# Patient Record
Sex: FEMALE | Race: OTHER | HISPANIC OR LATINO
[De-identification: names, ages, dates, MRNs, and addresses within clinical notes are randomized per-mention and may not be internally consistent; named-entity substitution may affect disease eponyms.]

---

## 2017-12-19 ENCOUNTER — APPOINTMENT (OUTPATIENT)
Dept: CARDIOLOGY | Facility: CLINIC | Age: 47
End: 2017-12-19
Payer: MEDICAID

## 2017-12-19 PROBLEM — Z00.00 ENCOUNTER FOR PREVENTIVE HEALTH EXAMINATION: Status: ACTIVE | Noted: 2017-12-19

## 2017-12-19 PROCEDURE — 93000 ELECTROCARDIOGRAM COMPLETE: CPT

## 2017-12-19 PROCEDURE — 99214 OFFICE O/P EST MOD 30 MIN: CPT

## 2018-02-02 ENCOUNTER — MEDICATION RENEWAL (OUTPATIENT)
Age: 48
End: 2018-02-02

## 2018-02-14 ENCOUNTER — RECORD ABSTRACTING (OUTPATIENT)
Age: 48
End: 2018-02-14

## 2018-02-14 DIAGNOSIS — Z86.79 PERSONAL HISTORY OF OTHER DISEASES OF THE CIRCULATORY SYSTEM: ICD-10-CM

## 2018-02-14 DIAGNOSIS — Z78.9 OTHER SPECIFIED HEALTH STATUS: ICD-10-CM

## 2018-03-02 ENCOUNTER — RX RENEWAL (OUTPATIENT)
Age: 48
End: 2018-03-02

## 2018-03-06 ENCOUNTER — APPOINTMENT (OUTPATIENT)
Dept: CARDIOLOGY | Facility: CLINIC | Age: 48
End: 2018-03-06
Payer: MEDICAID

## 2018-03-06 PROCEDURE — 93306 TTE W/DOPPLER COMPLETE: CPT

## 2018-03-20 ENCOUNTER — MEDICATION RENEWAL (OUTPATIENT)
Age: 48
End: 2018-03-20

## 2018-03-20 ENCOUNTER — RESULT REVIEW (OUTPATIENT)
Age: 48
End: 2018-03-20

## 2018-03-27 ENCOUNTER — APPOINTMENT (OUTPATIENT)
Dept: CARDIOLOGY | Facility: CLINIC | Age: 48
End: 2018-03-27
Payer: MEDICAID

## 2018-03-27 VITALS
WEIGHT: 134.31 LBS | SYSTOLIC BLOOD PRESSURE: 106 MMHG | BODY MASS INDEX: 26.37 KG/M2 | RESPIRATION RATE: 16 BRPM | DIASTOLIC BLOOD PRESSURE: 70 MMHG | HEIGHT: 60 IN | HEART RATE: 70 BPM

## 2018-03-27 LAB — INR PPP: 2.4 RATIO

## 2018-03-27 PROCEDURE — 85610 PROTHROMBIN TIME: CPT | Mod: QW

## 2018-03-27 PROCEDURE — 99215 OFFICE O/P EST HI 40 MIN: CPT

## 2018-03-27 PROCEDURE — 93000 ELECTROCARDIOGRAM COMPLETE: CPT

## 2018-03-27 RX ORDER — LISINOPRIL 5 MG/1
5 TABLET ORAL DAILY
Qty: 90 | Refills: 3 | Status: DISCONTINUED | COMMUNITY
End: 2018-03-27

## 2018-04-26 ENCOUNTER — MEDICATION RENEWAL (OUTPATIENT)
Age: 48
End: 2018-04-26

## 2018-04-26 ENCOUNTER — APPOINTMENT (OUTPATIENT)
Dept: CARDIOLOGY | Facility: CLINIC | Age: 48
End: 2018-04-26
Payer: MEDICAID

## 2018-04-26 VITALS — HEART RATE: 72 BPM | SYSTOLIC BLOOD PRESSURE: 100 MMHG | RESPIRATION RATE: 12 BRPM | DIASTOLIC BLOOD PRESSURE: 70 MMHG

## 2018-04-26 PROCEDURE — 85610 PROTHROMBIN TIME: CPT | Mod: QW

## 2018-04-26 PROCEDURE — 99211 OFF/OP EST MAY X REQ PHY/QHP: CPT

## 2018-04-27 LAB — INR PPP: 2.8 RATIO

## 2018-05-08 ENCOUNTER — APPOINTMENT (OUTPATIENT)
Dept: CARDIOLOGY | Facility: CLINIC | Age: 48
End: 2018-05-08
Payer: MEDICAID

## 2018-05-08 VITALS
HEART RATE: 70 BPM | HEIGHT: 62 IN | DIASTOLIC BLOOD PRESSURE: 70 MMHG | SYSTOLIC BLOOD PRESSURE: 110 MMHG | RESPIRATION RATE: 18 BRPM | BODY MASS INDEX: 23.92 KG/M2 | WEIGHT: 130 LBS

## 2018-05-08 PROCEDURE — 99214 OFFICE O/P EST MOD 30 MIN: CPT

## 2018-05-08 PROCEDURE — 93000 ELECTROCARDIOGRAM COMPLETE: CPT

## 2018-05-08 RX ORDER — CHLORHEXIDINE GLUCONATE 4 %
325 (65 FE) LIQUID (ML) TOPICAL TWICE DAILY
Refills: 0 | Status: DISCONTINUED | COMMUNITY
End: 2018-05-08

## 2018-05-25 ENCOUNTER — RX RENEWAL (OUTPATIENT)
Age: 48
End: 2018-05-25

## 2018-05-25 RX ORDER — WARFARIN 3 MG/1
3 TABLET ORAL DAILY
Qty: 90 | Refills: 1 | Status: DISCONTINUED | COMMUNITY
End: 2018-05-25

## 2018-06-05 ENCOUNTER — RX RENEWAL (OUTPATIENT)
Age: 48
End: 2018-06-05

## 2018-06-22 ENCOUNTER — APPOINTMENT (OUTPATIENT)
Dept: CARDIOLOGY | Facility: CLINIC | Age: 48
End: 2018-06-22
Payer: MEDICAID

## 2018-06-22 VITALS
HEIGHT: 62 IN | DIASTOLIC BLOOD PRESSURE: 68 MMHG | RESPIRATION RATE: 16 BRPM | WEIGHT: 133 LBS | BODY MASS INDEX: 24.48 KG/M2 | HEART RATE: 70 BPM | SYSTOLIC BLOOD PRESSURE: 108 MMHG

## 2018-06-22 PROCEDURE — 93000 ELECTROCARDIOGRAM COMPLETE: CPT

## 2018-06-22 PROCEDURE — 99214 OFFICE O/P EST MOD 30 MIN: CPT

## 2018-06-22 RX ORDER — WARFARIN 1 MG/1
1 TABLET ORAL
Qty: 30 | Refills: 0 | Status: COMPLETED | COMMUNITY
Start: 2017-04-20

## 2018-06-22 RX ORDER — SIMVASTATIN 20 MG/1
20 TABLET, FILM COATED ORAL
Qty: 30 | Refills: 0 | Status: COMPLETED | COMMUNITY
Start: 2017-10-02

## 2018-07-13 ENCOUNTER — RX RENEWAL (OUTPATIENT)
Age: 48
End: 2018-07-13

## 2018-07-26 LAB — INR PPP: 1.9 RATIO

## 2018-09-21 ENCOUNTER — MEDICATION RENEWAL (OUTPATIENT)
Age: 48
End: 2018-09-21

## 2018-09-27 ENCOUNTER — APPOINTMENT (OUTPATIENT)
Dept: CARDIOLOGY | Facility: CLINIC | Age: 48
End: 2018-09-27
Payer: MEDICAID

## 2018-09-27 VITALS
HEIGHT: 62 IN | SYSTOLIC BLOOD PRESSURE: 110 MMHG | HEART RATE: 70 BPM | DIASTOLIC BLOOD PRESSURE: 78 MMHG | BODY MASS INDEX: 24.66 KG/M2 | RESPIRATION RATE: 14 BRPM | WEIGHT: 134 LBS

## 2018-09-27 PROCEDURE — 85610 PROTHROMBIN TIME: CPT | Mod: QW

## 2018-09-27 PROCEDURE — 93000 ELECTROCARDIOGRAM COMPLETE: CPT

## 2018-09-27 PROCEDURE — 99214 OFFICE O/P EST MOD 30 MIN: CPT

## 2018-09-27 RX ORDER — SIMVASTATIN 40 MG/1
40 TABLET, FILM COATED ORAL DAILY
Qty: 45 | Refills: 3 | Status: DISCONTINUED | COMMUNITY
Start: 2018-02-02 | End: 2018-09-27

## 2018-11-19 ENCOUNTER — RX RENEWAL (OUTPATIENT)
Age: 48
End: 2018-11-19

## 2018-12-14 ENCOUNTER — APPOINTMENT (OUTPATIENT)
Dept: CARDIOLOGY | Facility: CLINIC | Age: 48
End: 2018-12-14
Payer: MEDICAID

## 2018-12-14 VITALS
DIASTOLIC BLOOD PRESSURE: 70 MMHG | SYSTOLIC BLOOD PRESSURE: 110 MMHG | WEIGHT: 134 LBS | RESPIRATION RATE: 14 BRPM | HEIGHT: 62 IN | HEART RATE: 70 BPM | BODY MASS INDEX: 24.66 KG/M2

## 2018-12-14 LAB — INR PPP: 2.2 RATIO

## 2018-12-14 PROCEDURE — 99214 OFFICE O/P EST MOD 30 MIN: CPT

## 2018-12-14 PROCEDURE — 93000 ELECTROCARDIOGRAM COMPLETE: CPT

## 2018-12-17 NOTE — HISTORY OF PRESENT ILLNESS
[FreeTextEntry1] : Mrs. Dubon presents today without complaints of exertional chest pain, shortness of breath, palpitations, lightheadedness, or syncope.

## 2018-12-17 NOTE — ASSESSMENT
[FreeTextEntry1] : 1.  EKG today reveals ventricular paced rhythm at 70 bpm with the underlying rhythm being that of atrial fibrillation. \par 2.  Chronic atrial fibrillation:  Patient clinically stable with an INR today of 2.2.  She has been advised to change her Coumadin to 3.5 mg a day other than Mondays and Thursdays when she will take three milligrams.  Patient advised to have her Coumadin checked on Mondays.  Will repeat INR in two weeks and if therapeutic, every four weeks thereafter.  \par 3.  Rheumatic valvular heart disease:  Patient’s metallic MVR demonstrates normal closure on auscultation.  \par 4.  Dilated cardiomyopathy status-post AICD implantation:  Clinically stable and due to undergo AICD interrogation in the next few weeks.  If stable, will see me in three months.   \par

## 2018-12-17 NOTE — PHYSICAL EXAM
[General Appearance - Well Developed] : well developed [General Appearance - Well Nourished] : well nourished [General Appearance - In No Acute Distress] : no acute distress [Normal Conjunctiva] : the conjunctiva exhibited no abnormalities [Normal Oral Mucosa] : normal oral mucosa [Regular] : the rhythm was regular [Systolic grade ___/6] : A grade [unfilled]/6 systolic murmur was heard. [Auscultation Breath Sounds / Voice Sounds] : lungs were clear to auscultation bilaterally [Bowel Sounds] : normal bowel sounds [Abnormal Walk] : normal gait [Skin Color & Pigmentation] : normal skin color and pigmentation [Skin Turgor] : normal skin turgor [Oriented To Time, Place, And Person] : oriented to person, place, and time [Affect] : the affect was normal [Mood] : the mood was normal [FreeTextEntry1] : No edema.

## 2018-12-17 NOTE — REASON FOR VISIT
[FreeTextEntry1] : Mrs. Dubon presents today for the evaluation and management of rheumatic valvular heart disease for which she is status-post metallic MVR, associated dilated cardiomyopathy with reduced left ventricular ejection fraction status-post AICD implantation, and chronic atrial fibrillation.    \par   \par

## 2019-01-02 ENCOUNTER — MEDICATION RENEWAL (OUTPATIENT)
Age: 49
End: 2019-01-02

## 2019-03-26 ENCOUNTER — APPOINTMENT (OUTPATIENT)
Dept: CARDIOLOGY | Facility: CLINIC | Age: 49
End: 2019-03-26
Payer: MEDICAID

## 2019-03-26 ENCOUNTER — NON-APPOINTMENT (OUTPATIENT)
Age: 49
End: 2019-03-26

## 2019-03-26 VITALS
BODY MASS INDEX: 25.4 KG/M2 | SYSTOLIC BLOOD PRESSURE: 108 MMHG | DIASTOLIC BLOOD PRESSURE: 60 MMHG | HEIGHT: 62 IN | HEART RATE: 70 BPM | RESPIRATION RATE: 16 BRPM | WEIGHT: 138 LBS

## 2019-03-26 PROCEDURE — 99214 OFFICE O/P EST MOD 30 MIN: CPT

## 2019-03-26 PROCEDURE — 93000 ELECTROCARDIOGRAM COMPLETE: CPT

## 2019-03-27 NOTE — PHYSICAL EXAM
[General Appearance - Well Developed] : well developed [General Appearance - Well Nourished] : well nourished [General Appearance - In No Acute Distress] : no acute distress [Normal Conjunctiva] : the conjunctiva exhibited no abnormalities [Normal Oral Mucosa] : normal oral mucosa [Auscultation Breath Sounds / Voice Sounds] : lungs were clear to auscultation bilaterally [Regular] : the rhythm was regular [Systolic grade ___/6] : A grade [unfilled]/6 systolic murmur was heard. [Bowel Sounds] : normal bowel sounds [Abnormal Walk] : normal gait [Skin Color & Pigmentation] : normal skin color and pigmentation [Skin Turgor] : normal skin turgor [Oriented To Time, Place, And Person] : oriented to person, place, and time [Affect] : the affect was normal [Mood] : the mood was normal [FreeTextEntry1] : No edema.

## 2019-03-27 NOTE — HISTORY OF PRESENT ILLNESS
[FreeTextEntry1] : Mrs. Dubon presents today for a follow up evaluation.  At this time, she denies exertional chest pain, shortness of breath, palpitations, lightheadedness, or syncope.  She states that she was seen in the emergency room at [facility] on March 17th when she presented there with “dizziness.”  The patient was evaluated and subsequently discharged to undergo outpatient follow up.  Since that time, she has had no further symptoms.

## 2019-03-27 NOTE — ASSESSMENT
[FreeTextEntry1] : 1.  EKG today reveals ventricular paced rhythm at 70 bpm. \par 2.  Dizziness:  Patient without evidence of arrhythmia on AICD interrogation while in hospital.  Has had no further symptoms since discharge.  Does admit to fever and some sort of an upper respiratory tract infection prior to her admission.  At this time, I have advised the patient on adequate hydration and continuation of current medications. \par 3.  Dilated cardiomyopathy/AICD:  Patient with known depleting battery.  Current lifespan approximately 6 months.  Will follow up at [place].  Wishes to see Dr. Fabián Arredondo. \par 4.  Chronic atrial fibrillation.  Remains clinically stable.  INR today 2.2.  I have advised her to take 6 mg of Coumadin today and then to augment her daily dose to 3.5 daily.  Repeat INR in 7 days. \par 5.  If clinically stable, follow up office visit here three months. \par

## 2019-03-27 NOTE — REASON FOR VISIT
[FreeTextEntry1] : Mrs. Dubon is a pleasant 48-year-old  female with a past medical history significant for rheumatic fever/rheumatic heart disease for which she is status-post metallic MVR as well as status-post AICD implantation for associated dilated cardiomyopathy with reduced left ventricular ejection fraction.  Patient also has a history of chronic atrial fibrillation and is currently on Coumadin therapy.

## 2019-03-28 LAB — INR PPP: 2.2 RATIO

## 2019-04-19 ENCOUNTER — RX RENEWAL (OUTPATIENT)
Age: 49
End: 2019-04-19

## 2019-04-19 ENCOUNTER — MEDICATION RENEWAL (OUTPATIENT)
Age: 49
End: 2019-04-19

## 2019-07-02 ENCOUNTER — NON-APPOINTMENT (OUTPATIENT)
Age: 49
End: 2019-07-02

## 2019-07-02 ENCOUNTER — APPOINTMENT (OUTPATIENT)
Dept: CARDIOLOGY | Facility: CLINIC | Age: 49
End: 2019-07-02
Payer: MEDICAID

## 2019-07-02 VITALS
BODY MASS INDEX: 24.29 KG/M2 | HEART RATE: 70 BPM | HEIGHT: 62 IN | WEIGHT: 132 LBS | SYSTOLIC BLOOD PRESSURE: 110 MMHG | DIASTOLIC BLOOD PRESSURE: 70 MMHG | RESPIRATION RATE: 16 BRPM

## 2019-07-02 LAB — INR PPP: 3.9 RATIO

## 2019-07-02 PROCEDURE — 93000 ELECTROCARDIOGRAM COMPLETE: CPT

## 2019-07-02 PROCEDURE — 99214 OFFICE O/P EST MOD 30 MIN: CPT

## 2019-07-03 NOTE — PHYSICAL EXAM
[General Appearance - Well Developed] : well developed [General Appearance - In No Acute Distress] : no acute distress [General Appearance - Well Nourished] : well nourished [Normal Oral Mucosa] : normal oral mucosa [Normal Conjunctiva] : the conjunctiva exhibited no abnormalities [Regular] : the rhythm was regular [Auscultation Breath Sounds / Voice Sounds] : lungs were clear to auscultation bilaterally [Bowel Sounds] : normal bowel sounds [Systolic grade ___/6] : A grade [unfilled]/6 systolic murmur was heard. [Abnormal Walk] : normal gait [Skin Color & Pigmentation] : normal skin color and pigmentation [Skin Turgor] : normal skin turgor [Oriented To Time, Place, And Person] : oriented to person, place, and time [Affect] : the affect was normal [Mood] : the mood was normal [FreeTextEntry1] : No edema.

## 2019-07-03 NOTE — REASON FOR VISIT
[FreeTextEntry1] : Mrs. Dubon is a pleasant 48-year-old  female with a past medical history significant for rheumatic fever/rheumatic heart disease for which he is status-post metallic mitral valve replacement as well as the implantation of an AICD for associated dilated cardiomyopathy with reduced left ventricular ejection fraction.  Patient also has a history of atrial fibrillation.

## 2019-07-03 NOTE — ASSESSMENT
[FreeTextEntry1] : 1.  EKG today demonstrates ventricular paced rhythm at 70.  The underlying rhythm appears to be that of atrial fibrillation.\par 2.  Rheumatic fever/rheumatic valvular heart disease:  Patient with MVR with crisp closing sounds.  INR today 3.9.  Advised to hold one dose of Coumadin and continue with same daily dose.  INR check in 1-2 days.  INR range 2.5-3.5.  \par 3.  Dilated cardiomyopathy/left ventricular dysfunction/AICD:  Patient remains clinically stable at this time.  She states that she will likely need an AICD replacement in the near future.  She is followed by Dr. Fabián Arredondo at [facility].  If clinically stable from a cardiac standpoint, follow up office visit three months.    \par

## 2019-07-03 NOTE — HISTORY OF PRESENT ILLNESS
[FreeTextEntry1] : Mrs. Dubon presents today without complaints of exertional chest pain, shortness of breath, palpitations, lightheadedness, or syncope.  She did have inflammation of her right knee and states that she received a cortisone shot by an orthopedic surgeon with significant improvement.

## 2019-07-12 ENCOUNTER — RX RENEWAL (OUTPATIENT)
Age: 49
End: 2019-07-12

## 2019-07-12 ENCOUNTER — MEDICATION RENEWAL (OUTPATIENT)
Age: 49
End: 2019-07-12

## 2019-10-08 ENCOUNTER — APPOINTMENT (OUTPATIENT)
Dept: CARDIOLOGY | Facility: CLINIC | Age: 49
End: 2019-10-08

## 2019-10-15 ENCOUNTER — APPOINTMENT (OUTPATIENT)
Dept: CARDIOLOGY | Facility: CLINIC | Age: 49
End: 2019-10-15
Payer: MEDICAID

## 2019-10-15 ENCOUNTER — NON-APPOINTMENT (OUTPATIENT)
Age: 49
End: 2019-10-15

## 2019-10-15 VITALS
WEIGHT: 133 LBS | RESPIRATION RATE: 14 BRPM | BODY MASS INDEX: 24.48 KG/M2 | HEART RATE: 74 BPM | SYSTOLIC BLOOD PRESSURE: 104 MMHG | DIASTOLIC BLOOD PRESSURE: 68 MMHG | HEIGHT: 62 IN

## 2019-10-15 LAB — INR PPP: 2.1 RATIO

## 2019-10-15 PROCEDURE — 93000 ELECTROCARDIOGRAM COMPLETE: CPT

## 2019-10-15 PROCEDURE — 99214 OFFICE O/P EST MOD 30 MIN: CPT

## 2019-10-15 RX ORDER — WARFARIN 3 MG/1
3 TABLET ORAL
Qty: 90 | Refills: 3 | Status: DISCONTINUED | COMMUNITY
Start: 2018-05-25 | End: 2019-10-15

## 2019-10-15 NOTE — HISTORY OF PRESENT ILLNESS
[FreeTextEntry1] : Ms. Dubon presents today for follow up evaluation status-post biventricular ICD generator replacement procedure along with laser lead extraction and reimplantation.  Presently she is without complaints of chest pain, shortness of breath, palpitations, lightheadedness or syncope.  She states that she feels fatigued and is having some mild discomfort at the ICD incision site.  She is requesting a letter stating that is no longer able to work.  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

## 2019-10-15 NOTE — REASON FOR VISIT
[FreeTextEntry1] : The patient is a 48 y.o.  female with a past medical history significant for rheumatic fever/rheumatic heart disease for which she is status-post metallic mitral valve replacement as well as implantation of an AICD for associated dilated cardiomyopathy with reduced left ventricular ejection fraction.  History of atrial fibrillation.

## 2019-10-15 NOTE — DISCUSSION/SUMMARY
[FreeTextEntry1] : Case and plan discussed with Dr. Mauro.\par \par 1 - Rheumatic fever/rheumatic heart disease, status-post MVR.  Today's INR is 2.1.  Patient instructed to take coumadin 8mg tonight and then resume 4mg daily.  Recheck INR in 1 week.  States she gets her INR's checked in [place] with her PCP.\par \par 2 - Dilated cardiomyopathy/left ventricular dysfunction status-post AICD.  Patient is status-post biventricular ICD generator replacement procedure along with laser lead extraction and reimplantation.  Has follow up with Dr. Fuller on 10/29/19.  States she is feeling fatigued and feels she is no longer able to work.\par \par 3 - Recent labs 10/1/19): potassium 3.8, BUN 19, creatinine 1.8, glucose 104, HgA1c 5.6, H/H 12.2/36.9, platelets 274.\par \par 4 - Follow up in 1-2 weeks to discuss ability to work with Dr. Landers.

## 2019-10-15 NOTE — PHYSICAL EXAM
[General Appearance - Well Developed] : well developed [General Appearance - In No Acute Distress] : no acute distress [Normal Conjunctiva] : the conjunctiva exhibited no abnormalities [Normal Oral Mucosa] : normal oral mucosa [Auscultation Breath Sounds / Voice Sounds] : lungs were clear to auscultation bilaterally [] : no respiratory distress [Abnormal Walk] : normal gait [Bowel Sounds] : normal bowel sounds [Skin Color & Pigmentation] : normal skin color and pigmentation [Skin Turgor] : normal skin turgor [Oriented To Time, Place, And Person] : oriented to person, place, and time [Affect] : the affect was normal [Mood] : the mood was normal [FreeTextEntry1] : No edema

## 2019-10-24 ENCOUNTER — APPOINTMENT (OUTPATIENT)
Dept: CARDIOLOGY | Facility: CLINIC | Age: 49
End: 2019-10-24
Payer: MEDICAID

## 2019-10-24 VITALS
HEART RATE: 70 BPM | HEIGHT: 60 IN | BODY MASS INDEX: 25.91 KG/M2 | DIASTOLIC BLOOD PRESSURE: 80 MMHG | SYSTOLIC BLOOD PRESSURE: 121 MMHG | WEIGHT: 132 LBS | RESPIRATION RATE: 14 BRPM

## 2019-10-24 PROCEDURE — 93000 ELECTROCARDIOGRAM COMPLETE: CPT

## 2019-10-24 PROCEDURE — 99214 OFFICE O/P EST MOD 30 MIN: CPT | Mod: 25

## 2019-10-24 NOTE — HISTORY OF PRESENT ILLNESS
[FreeTextEntry1] : Ms. Dubon presents today for follow up evaluation.  Presently she is without complaints of chest pain, shortness of breath, palpitations, lightheadedness or syncope.  Continues to feel fatigued and she is requesting a letter stating that is no longer able to work. 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

## 2019-10-24 NOTE — PHYSICAL EXAM
[General Appearance - Well Developed] : well developed [General Appearance - In No Acute Distress] : no acute distress [Normal Conjunctiva] : the conjunctiva exhibited no abnormalities [Normal Oral Mucosa] : normal oral mucosa [] : no respiratory distress [Auscultation Breath Sounds / Voice Sounds] : lungs were clear to auscultation bilaterally [Bowel Sounds] : normal bowel sounds [Abnormal Walk] : normal gait [Skin Color & Pigmentation] : normal skin color and pigmentation [Skin Turgor] : normal skin turgor [Oriented To Time, Place, And Person] : oriented to person, place, and time [Affect] : the affect was normal [Mood] : the mood was normal [FreeTextEntry1] : No edema

## 2019-10-24 NOTE — REASON FOR VISIT
[FreeTextEntry1] : The patient is a 48 y.o.  female with a past medical history significant for rheumatic fever/rheumatic heart disease for which she is status-post metallic mitral valve replacement as well as implantation of an AICD for associated dilated cardiomyopathy with reduced left ventricular ejection fraction.  History of atrial fibrillation.  Presents today for follow up evaluation.

## 2019-10-24 NOTE — DISCUSSION/SUMMARY
[FreeTextEntry1] : Dr. Landers in to speak with the patient.\par \par At this time, Ms Dubon can work with restrictions at [employer].  She has significant LV dysfunction secondary to cardiac disese, and chronic valvular cardiomyopathy for which she is status-post AICD implantation.  She cannot do physical activity for any significant amount of time.  She cannot stand for longer that an hour to an hour and a half at a time.  She has limited exercise tolerance.  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 Landers in 3 months.

## 2020-01-07 ENCOUNTER — APPOINTMENT (OUTPATIENT)
Dept: CARDIOLOGY | Facility: CLINIC | Age: 50
End: 2020-01-07
Payer: MEDICAID

## 2020-01-07 VITALS
RESPIRATION RATE: 14 BRPM | BODY MASS INDEX: 26.31 KG/M2 | HEART RATE: 70 BPM | SYSTOLIC BLOOD PRESSURE: 110 MMHG | DIASTOLIC BLOOD PRESSURE: 68 MMHG | WEIGHT: 134 LBS | HEIGHT: 60 IN

## 2020-01-07 LAB — INR PPP: 2.2 RATIO

## 2020-01-07 PROCEDURE — 93000 ELECTROCARDIOGRAM COMPLETE: CPT

## 2020-01-07 PROCEDURE — 99214 OFFICE O/P EST MOD 30 MIN: CPT

## 2020-01-08 NOTE — REASON FOR VISIT
[FreeTextEntry1] : Mrs. Dubon is a pleasant 49-year-old  female with a past medical history significant for rheumatic fever/rheumatic heart disease who is status-post metallic MVR as well as the implantation of an AICD who presents for follow up evaluation.  \par \par

## 2020-01-08 NOTE — HISTORY OF PRESENT ILLNESS
[FreeTextEntry1] :  Mrs. Dubon presents today without complaints of exertional chest pain, shortness of breath, palpitations, lightheadedness, or syncope.  She is known to have a dilated cardiomyopathy with reduced left ventricular systolic function.

## 2020-01-08 NOTE — PHYSICAL EXAM
[General Appearance - Well Developed] : well developed [General Appearance - Well Nourished] : well nourished [General Appearance - In No Acute Distress] : no acute distress [Normal Conjunctiva] : the conjunctiva exhibited no abnormalities [Normal Oral Mucosa] : normal oral mucosa [Auscultation Breath Sounds / Voice Sounds] : lungs were clear to auscultation bilaterally [Regular] : the rhythm was regular [Systolic grade ___/6] : A grade [unfilled]/6 systolic murmur was heard. [Bowel Sounds] : normal bowel sounds [Abnormal Walk] : normal gait [Skin Color & Pigmentation] : normal skin color and pigmentation [Skin Turgor] : normal skin turgor [Oriented To Time, Place, And Person] : oriented to person, place, and time [Affect] : the affect was normal [Mood] : the mood was normal [FreeTextEntry1] : "Crisp" MV closure sound

## 2020-01-08 NOTE — ASSESSMENT
[FreeTextEntry1] : 1.  EKG today reveals ventricular paced rhythm at 70 bpm.  The underlying rhythm appears to be that of atrial fibrillation.\par 2.  Rheumatic fever/rheumatic heart disease:  Patient with crisp-sounding mitral closure.  INR today 2.2.  I have advised her to take 6 mg of Coumadin today then resume 4 mg daily.  INR chest in two weeks.  Patient advised to maintain her INR between 2.5 and 3.5 at all times.  \par 3.  Given the above, the patient is advised on a strict low-fat / low-cholesterol diet and regular aerobic exercise.  If clinically stable, office visit three months. \par

## 2020-01-09 ENCOUNTER — MEDICATION RENEWAL (OUTPATIENT)
Age: 50
End: 2020-01-09

## 2020-01-09 ENCOUNTER — RX RENEWAL (OUTPATIENT)
Age: 50
End: 2020-01-09

## 2020-04-07 ENCOUNTER — APPOINTMENT (OUTPATIENT)
Dept: CARDIOLOGY | Facility: CLINIC | Age: 50
End: 2020-04-07

## 2020-04-15 ENCOUNTER — NON-APPOINTMENT (OUTPATIENT)
Age: 50
End: 2020-04-15

## 2020-04-16 LAB — INR PPP: 4.35

## 2020-04-24 LAB — INR PPP: 2.86

## 2020-04-30 ENCOUNTER — RX RENEWAL (OUTPATIENT)
Age: 50
End: 2020-04-30

## 2020-05-13 LAB — INR PPP: 3.23

## 2020-06-10 LAB — INR PPP: 3.34

## 2020-06-11 ENCOUNTER — APPOINTMENT (OUTPATIENT)
Dept: CARDIOLOGY | Facility: CLINIC | Age: 50
End: 2020-06-11

## 2020-07-14 ENCOUNTER — APPOINTMENT (OUTPATIENT)
Dept: CARDIOLOGY | Facility: CLINIC | Age: 50
End: 2020-07-14
Payer: MEDICAID

## 2020-07-14 VITALS
HEART RATE: 70 BPM | BODY MASS INDEX: 27.29 KG/M2 | SYSTOLIC BLOOD PRESSURE: 119 MMHG | DIASTOLIC BLOOD PRESSURE: 81 MMHG | WEIGHT: 139 LBS | RESPIRATION RATE: 14 BRPM | HEIGHT: 60 IN | TEMPERATURE: 97.5 F

## 2020-07-14 PROCEDURE — 99214 OFFICE O/P EST MOD 30 MIN: CPT

## 2020-07-14 PROCEDURE — 85610 PROTHROMBIN TIME: CPT | Mod: QW

## 2020-07-14 PROCEDURE — 93000 ELECTROCARDIOGRAM COMPLETE: CPT

## 2020-07-14 PROCEDURE — 93793 ANTICOAG MGMT PT WARFARIN: CPT

## 2020-07-14 RX ORDER — ASPIRIN 81 MG
81 TABLET, DELAYED RELEASE (ENTERIC COATED) ORAL DAILY
Refills: 0 | Status: ACTIVE | COMMUNITY

## 2020-07-15 NOTE — HISTORY OF PRESENT ILLNESS
[FreeTextEntry1] :  From a cardiac standpoint, Mrs. Dubon denies exertional chest pain, shortness of breath, palpitations, lightheadedness, or syncope.

## 2020-07-15 NOTE — ASSESSMENT
[FreeTextEntry1] : \par \par 1.  EKG today reveals ventricular paced rhythm with occasional PVCs.  The underlying rhythm is atrial fibrillation. \par \par 2.  Rheumatic heart disease status-post metallic MVR:  Valve sounds remain crisp.  INR today 2.9.  Patient currently alternating Coumadin 3 with 4 mg on an every-other-day basis.  Advised to continue this and follow closely.  \par \par 3.  Abnormal LFTs:  Patient again noted to have abnormalities in AST and ALT.  Currently on Norethindrone for regularity of menstrual cycle.  I have discussed the possibility that this drug is causing her abnormal LFTs and encouraged the patient to follow up with her GYN for further assessment.  \par \par 4.  Review of recent lipid profile demonstrates a total cholesterol of 192, HDL 35, TC/HDL ratio 5.5, , triglycerides 86.  Patient advised on a stricter low-fat / low-cholesterol diet.  Regular aerobic exercise recommended.  \par \par 5.  AICD:  Patient followed by Dr. Fabián Arredondo at [facility].  Advised to check with him on an every three month basis.  \par \par 6.  If clinically stable, will follow up in approximately three months.   \par

## 2020-07-15 NOTE — PHYSICAL EXAM
[General Appearance - Well Developed] : well developed [General Appearance - Well Nourished] : well nourished [General Appearance - In No Acute Distress] : no acute distress [Normal Conjunctiva] : the conjunctiva exhibited no abnormalities [Normal Oral Mucosa] : normal oral mucosa [Auscultation Breath Sounds / Voice Sounds] : lungs were clear to auscultation bilaterally [Regular] : the rhythm was regular [Systolic grade ___/6] : A grade [unfilled]/6 systolic murmur was heard. [Bowel Sounds] : normal bowel sounds [Abnormal Walk] : normal gait [Skin Turgor] : normal skin turgor [Skin Color & Pigmentation] : normal skin color and pigmentation [Affect] : the affect was normal [Mood] : the mood was normal [Oriented To Time, Place, And Person] : oriented to person, place, and time [FreeTextEntry1] : "Crisp" MV closure sound

## 2020-07-16 LAB — INR PPP: 2.9

## 2020-10-30 ENCOUNTER — RX RENEWAL (OUTPATIENT)
Age: 50
End: 2020-10-30

## 2020-11-13 ENCOUNTER — NON-APPOINTMENT (OUTPATIENT)
Age: 50
End: 2020-11-13

## 2021-01-12 ENCOUNTER — RX RENEWAL (OUTPATIENT)
Age: 51
End: 2021-01-12

## 2021-02-11 ENCOUNTER — NON-APPOINTMENT (OUTPATIENT)
Age: 51
End: 2021-02-11

## 2021-02-11 ENCOUNTER — APPOINTMENT (OUTPATIENT)
Dept: CARDIOLOGY | Facility: CLINIC | Age: 51
End: 2021-02-11
Payer: MEDICAID

## 2021-02-11 VITALS
HEART RATE: 71 BPM | HEIGHT: 60 IN | TEMPERATURE: 97.2 F | DIASTOLIC BLOOD PRESSURE: 60 MMHG | WEIGHT: 128 LBS | BODY MASS INDEX: 25.13 KG/M2 | SYSTOLIC BLOOD PRESSURE: 116 MMHG | RESPIRATION RATE: 14 BRPM

## 2021-02-11 DIAGNOSIS — R94.5 ABNORMAL RESULTS OF LIVER FUNCTION STUDIES: ICD-10-CM

## 2021-02-11 LAB — INR PPP: 4.1

## 2021-02-11 PROCEDURE — 99072 ADDL SUPL MATRL&STAF TM PHE: CPT

## 2021-02-11 PROCEDURE — 93000 ELECTROCARDIOGRAM COMPLETE: CPT

## 2021-02-11 PROCEDURE — 99213 OFFICE O/P EST LOW 20 MIN: CPT

## 2021-02-11 RX ORDER — NORETHINDRONE ACETATE 5 MG/1
5 TABLET ORAL DAILY
Refills: 0 | Status: DISCONTINUED | COMMUNITY
End: 2021-02-11

## 2021-02-11 NOTE — PHYSICAL EXAM
[General Appearance - Well Developed] : well developed [General Appearance - In No Acute Distress] : no acute distress [Normal Conjunctiva] : the conjunctiva exhibited no abnormalities [Normal Oral Mucosa] : normal oral mucosa [] : no respiratory distress [Auscultation Breath Sounds / Voice Sounds] : lungs were clear to auscultation bilaterally [Edema] : no peripheral edema present [Bowel Sounds] : normal bowel sounds [Abnormal Walk] : normal gait [FreeTextEntry1] : No edema [Skin Color & Pigmentation] : normal skin color and pigmentation [Skin Turgor] : normal skin turgor [Oriented To Time, Place, And Person] : oriented to person, place, and time [Affect] : the affect was normal [Mood] : the mood was normal

## 2021-02-11 NOTE — REASON FOR VISIT
[FreeTextEntry1] : The patient is a pleasant 50-year-old  female with a past medical history significant for rheumatic fever/rheumatic heart disease who is status-post metallic MVR as well as the implantation of an AICD who presents for follow up evaluation.

## 2021-02-11 NOTE — HISTORY OF PRESENT ILLNESS
[FreeTextEntry1] : Mrs. Dubon presents today without complaints of chest pain, shortness of breath, palpitations, lightheadedness or syncope.

## 2021-02-11 NOTE — DISCUSSION/SUMMARY
[FreeTextEntry1] : 1 - Rheumatic heart disease status-post metallic MVR:  clinically stable at this time.  No chest pain, shortness of breath or palpitations.  Taking coumadin 6mg alternating with 7mg every other day.  INRs monitored by PCP, however, patient has not had it checked in over one month.  INR today is 4.1 (target INR 2.5-3.5)  Instructed to hold tonight's dose and continue usual dosing tomorrow.  She is to have INR checked with PCP in 2 weeks.  Will schedule for follow up echocardiogram.\par \par 2 - Abnormal LFTs:  patient with elevated LFTs at last visit.  She was taken off of Norethindrone as it was thought to be reason for elevation.  States her LFTs have since then been checked and are "normal".\par \par 3 - Hyperlipidemia:  patient advised to follow strict low fat/ low cholesterol diet.  Fasting blood work prior to follow up visit.\par \par 4 - AICD:  Mrs. Dubon is followed by Dr. Fabián Arredondo ([facility]).  States her device was checked last month and is scheduled to follow up with him in June of this year.\par \par 5 - Follow up with Dr. Landers in 3 months.

## 2021-03-19 LAB
INR PPP: 3.18
PROTHROMBIN TIME COMMENT: NORMAL

## 2021-05-20 ENCOUNTER — APPOINTMENT (OUTPATIENT)
Dept: CARDIOLOGY | Facility: CLINIC | Age: 51
End: 2021-05-20
Payer: MEDICAID

## 2021-05-20 VITALS
HEART RATE: 70 BPM | SYSTOLIC BLOOD PRESSURE: 108 MMHG | BODY MASS INDEX: 23.79 KG/M2 | DIASTOLIC BLOOD PRESSURE: 70 MMHG | TEMPERATURE: 97.7 F | RESPIRATION RATE: 14 BRPM | WEIGHT: 126 LBS | HEIGHT: 61 IN

## 2021-05-20 LAB — INR PPP: 4.4 RATIO

## 2021-05-20 PROCEDURE — 85610 PROTHROMBIN TIME: CPT | Mod: QW

## 2021-05-20 PROCEDURE — 99215 OFFICE O/P EST HI 40 MIN: CPT

## 2021-05-20 PROCEDURE — 93000 ELECTROCARDIOGRAM COMPLETE: CPT

## 2021-05-20 PROCEDURE — 93306 TTE W/DOPPLER COMPLETE: CPT

## 2021-05-20 RX ORDER — WARFARIN 4 MG/1
4 TABLET ORAL
Qty: 90 | Refills: 1 | Status: DISCONTINUED | COMMUNITY
End: 2021-05-20

## 2021-05-20 NOTE — REASON FOR VISIT
[FreeTextEntry1] : Mrs. Dubon is a pleasant 50-year-old  female with a past medical history significant for rheumatic fever/rheumatic heart disease status-post mitral valve repair 1987 followed by mechanical MVR in 1995, as well as associated cardiomyopathy with reduced left ventricular ejection fraction status-post AICD and chronic atrial fibrillation who presents for follow up evaluation.  \par

## 2021-05-20 NOTE — PHYSICAL EXAM
[General Appearance - Well Developed] : well developed [General Appearance - In No Acute Distress] : no acute distress [Normal Conjunctiva] : the conjunctiva exhibited no abnormalities [Normal Oral Mucosa] : normal oral mucosa [] : no respiratory distress [Auscultation Breath Sounds / Voice Sounds] : lungs were clear to auscultation bilaterally [Edema] : no peripheral edema present [Irregularly Irregular] : the rhythm was irregularly irregular [Bowel Sounds] : normal bowel sounds [Abnormal Walk] : normal gait [Skin Color & Pigmentation] : normal skin color and pigmentation [Skin Turgor] : normal skin turgor [Oriented To Time, Place, And Person] : oriented to person, place, and time [Affect] : the affect was normal [Mood] : the mood was normal [FreeTextEntry1] : No edema

## 2021-05-20 NOTE — HISTORY OF PRESENT ILLNESS
[FreeTextEntry1] :  From a cardiac standpoint, Mrs. Dubon currently denies exertional chest pain but does admit to dyspnea on exertion and progressive fatigue.  She has not had palpitations, lightheadedness, or actual syncope.

## 2021-05-20 NOTE — ASSESSMENT
[FreeTextEntry1] : 1.  EKG today reveals ventricular paced rhythm at 70 bpm.  \par \par 2.  Rheumatic fever/rheumatic heart disease status-post MVR:  Echocardiography today demonstrates a well-seated, well-functioning [employer]’s prosthesis in the mitral position without evidence of stenosis or regurgitation.  Patient’s INR 4.4.  I have advised her to hold Coumadin today and to reduce her current regimen of alternating 6 with 7 mg on an every-other-day basis to a 6 mg per day basis.  Repeat INR in 7 days.  \par \par 3.  Dilated cardiomyopathy:  Patient’s recent echocardiography revealed dilatation of the left ventricle to approximately 6 cm and demonstrates marked global hypokinesis with an ejection now under 20%.  I suspect that this is part of the reason why the patient is fatigued.  At this time, her blood pressures borderline and it will be a challenge to attempt Entresto versus just plain ACE inhibition.  I have elected to refer her early to the advanced heart failure team in order to establish a relationship with the patient as her youthful age will likely lead her towards cardiac transplantation at some point in the future.  If clinically stable, follow up office visit here three months.    \par

## 2021-05-27 LAB — PROTHROMBIN TIME COMMENT: NORMAL

## 2021-06-16 ENCOUNTER — APPOINTMENT (OUTPATIENT)
Dept: HEART FAILURE | Facility: CLINIC | Age: 51
End: 2021-06-16
Payer: MEDICAID

## 2021-06-16 ENCOUNTER — NON-APPOINTMENT (OUTPATIENT)
Age: 51
End: 2021-06-16

## 2021-06-16 VITALS
HEIGHT: 61 IN | HEART RATE: 70 BPM | OXYGEN SATURATION: 100 % | SYSTOLIC BLOOD PRESSURE: 100 MMHG | WEIGHT: 127 LBS | BODY MASS INDEX: 23.98 KG/M2 | DIASTOLIC BLOOD PRESSURE: 64 MMHG | TEMPERATURE: 98.8 F

## 2021-06-16 VITALS — DIASTOLIC BLOOD PRESSURE: 66 MMHG | SYSTOLIC BLOOD PRESSURE: 100 MMHG

## 2021-06-16 PROCEDURE — 99205 OFFICE O/P NEW HI 60 MIN: CPT

## 2021-06-16 RX ORDER — FUROSEMIDE 40 MG/1
40 TABLET ORAL
Qty: 90 | Refills: 1 | Status: DISCONTINUED | COMMUNITY
Start: 2021-01-12 | End: 2021-06-16

## 2021-06-17 NOTE — DISCUSSION/SUMMARY
[FreeTextEntry3] : 2 weeks with Hiral Carney NP and in 2 months with AYANNA Crockett MD [FreeTextEntry1] : \par 51 y/o female with h/o chronic systolic HF ACC/AHA stage C, NICMP/valvular LVEF 13% LVIDd 6cm, rheumatic heart disease s/p mitral valve repair '87 followed by mechanical MVR 1995, s/p ICD and chronic atrial fibrillation who was referred for HF care. She reports progressive MORALES, NYHA II-III. No symptoms to suggest congestion or recent HF related hospitalizations. Clinically looks euvolemic/warm extremities. Last TTE showed dilated LV with LVEF 15% and normal functioning mechanical MVR. Needs CPET for risk stratification, will optimize GDMT first. \par Plan as above. \par \par

## 2021-06-17 NOTE — HISTORY OF PRESENT ILLNESS
[FreeTextEntry1] : 51 y/o female with h/o chronic systolic HF ACC/AHA stage C, NICMP/valvular LVEF 13% LVIDd 6cm, rheumatic heart disease s/p mitral valve repair '87 followed by mechanical MVR 1995, s/p ICD and chronic atrial fibrillation who comes to establish care at our HF clinic. \par \par She states her heart disease started in 1987 when she presented to the hospital with MORALES. She was found to have mitral disease associated to rheumatic heart disease and she underwent MV repair. She did okay for ~8y when she presented symptoms again. She believes at that time she was diagnosed with cardiomyopathy, she underwent re-do sternotomy with mechanical MVR followed by ICD placement. She has undergone multiple generators exchange since then. She believes she may had received an ICD shock 'many years ago' while [place] the [place]. She did not seek medical help at that time.\par \par She lives with her family. She is currently unemployed, stopped working 1-2 years ago. Denies tobacco, illicit drug use or etoh use. She denies FH of heart disease. She is originally from  and migrated [place] ~[place] years ago. \par \par Today she reports she has noted progressive MORALES, especially when walking ~1 block. She started exercising with online videos and is able to exercise ~30 minutes. She does have to make frequent stops during her exercise sessions. She denies Le edema, orthopnea, cough, dizziness, lightheadedness or chest pain.  No recent hospitalizations.

## 2021-06-17 NOTE — ASSESSMENT
[FreeTextEntry1] : #Chronic systolic HF ACC/AHA stage C, NYHA II-III\par NICMP/valvular LVEF 13% LVIDd 6cm\par Looks euolemic, warm extremities\par GDMT: start entresto 24-26mg BID. Continue coreg 6.25mg BID, aldactone 25mg alternating 50mg daily. Plan to add farxiga if BP allows. If BP remains low can switch coreg to Toprol xl. \par Diuretics: decrease lasix to 20mg daily\par Obtain labs 7-10 days after starting entresto including BMP, ProBNP and iron pane;\par Device: s/p ICD. follows with EP (Fabián Arredondo MD)\par Plan for CPx once on full GDMT\par \par #Rheumatic heart disease \par H/o MV repair 1987 followed by mechanical MVR 1995\par AC: warfarin managed by Dr. Llanes\par \par #Chronic atrial fibrillation\par Valvular\par AC as above

## 2021-06-17 NOTE — PHYSICAL EXAM
[No Murmur] : no murmur [No Rub] : no rub [No Gallop] : no gallop [Normal] : no edema, no cyanosis, no clubbing, no varicosities [de-identified] : mechanical click

## 2021-06-17 NOTE — CARDIOLOGY SUMMARY
[de-identified] : 5/20/21 TTE LVIDd 6cm LVEF 13% LVOT VTI 23.5cm, mechanical MVR with normal function, mildly reduced RV systolic function, RVSP 22 mmHg, mod-sev TR\par \par 3/6/2018 TTE LVIDd 5.7cm LVEF 23% , normal functioning mechanical MVR, mildly reduced RV systolic function, RVSP 41 mmHg\par

## 2021-06-17 NOTE — REVIEW OF SYSTEMS
[FreeTextEntry1] : A complete review of systems was obtained and is negative except as stated in HPI (systems reviewed: Const, Eyes, ENT, Resp, CV, GI, , MSK, Skin, Neuro, Pysch, Endo, Hemato/Lymph and Allergy/Immuno).

## 2021-06-17 NOTE — REASON FOR VISIT
[Cardiac Failure] : cardiac failure [FreeTextEntry3] : Long Landers MD [FreeTextEntry1] : Primary cards: Long Landers MD

## 2021-07-06 ENCOUNTER — APPOINTMENT (OUTPATIENT)
Dept: HEART FAILURE | Facility: CLINIC | Age: 51
End: 2021-07-06
Payer: MEDICAID

## 2021-07-06 VITALS
BODY MASS INDEX: 23.6 KG/M2 | OXYGEN SATURATION: 97 % | DIASTOLIC BLOOD PRESSURE: 56 MMHG | SYSTOLIC BLOOD PRESSURE: 90 MMHG | WEIGHT: 125 LBS | TEMPERATURE: 98.8 F | HEIGHT: 61 IN | HEART RATE: 70 BPM

## 2021-07-06 PROCEDURE — 99214 OFFICE O/P EST MOD 30 MIN: CPT

## 2021-07-06 RX ORDER — CARVEDILOL 6.25 MG/1
6.25 TABLET, FILM COATED ORAL
Qty: 180 | Refills: 3 | Status: DISCONTINUED | COMMUNITY
Start: 2018-03-02 | End: 2021-07-06

## 2021-07-06 NOTE — CARDIOLOGY SUMMARY
[de-identified] : \par 5/20/21 TTE LVIDd 6cm LVEF 13% LVOT VTI 23.5cm, mechanical MVR with normal function, mildly reduced RV systolic function, RVSP 22 mmHg, mod-sev TR\par \par 3/6/2018 TTE LVIDd 5.7cm LVEF 23% , normal functioning mechanical MVR, mildly reduced RV systolic function, RVSP 41 mmHg\par

## 2021-07-06 NOTE — PHYSICAL EXAM
[No Murmur] : no murmur [No Rub] : no rub [No Gallop] : no gallop [Normal] : alert and oriented, normal memory [de-identified] : supple, no JVD [de-identified] : mechanical click

## 2021-07-06 NOTE — ASSESSMENT
[FreeTextEntry1] : # Chronic systolic HF ACC/AHA stage C, NYHA II-III\par NICMP/valvular LVEF 13% LVIDd 6cm\par Euolemic on exam, warm extremities\par GDMT: Stop Carvedilol. Start Toprol-XL 25mg daily. Continue Entresto 24-26mg BID and Aldactone 25mg daily alternating with 50mg daily. Plan to add Farxiga if BP allows.\par Diuretics: Lasix to 20mg daily\par Device: s/p ICD. follows with EP (Fabián Arredondo MD)\par Plan for CPx once on full GDMT\par \par # Rheumatic heart disease \par H/o MV repair 1987 followed by mechanical MVR 1995\par AC: warfarin managed by Dr. Landers\par \par # Chronic atrial fibrillation\par Valvular\par Asymptomatic\par AC as above

## 2021-07-06 NOTE — DISCUSSION/SUMMARY
[FreeTextEntry3] : 2-3 weeks with Dr. Crockett [FreeTextEntry1] : 51 y/o female with h/o chronic systolic HF ACC/AHA stage C, NICMP/valvular LVEF 13% LVIDd 6cm, rheumatic heart disease s/p mitral valve repair '87 followed by mechanical MVR 1995, s/p ICD and chronic atrial fibrillation who presents for scheduled follow-up.\par \par Appears stable without evidence of decompensated HF. NYHA II-III. Euvolemic on exam. Recently started on Entresto. Questionable symptom of abdominal discomfort. Patient agrees to remain on Entresto for now. Will switch Coreg to Toprol in view of low BP. Last TTE showed dilated LV with LVEF 15% and normal functioning mechanical MVR. Needs CPET for risk stratification, will optimize GDMT first. \par \par The above problem/s and plan was previously established and followed according to physician outline.\par \par Hiral Carney, MS, NP-C\par \par

## 2021-07-06 NOTE — HISTORY OF PRESENT ILLNESS
[FreeTextEntry1] : 49 y/o female with h/o chronic systolic HF ACC/AHA stage C, NICMP/valvular LVEF 13% LVIDd 6cm, rheumatic heart disease s/p mitral valve repair '87 followed by mechanical MVR 1995, s/p ICD and chronic atrial fibrillation who presents for scheduled follow-up.\par \par She states her heart disease started in 1987 when she presented to the hospital with MORALES. She was found to have mitral disease associated to rheumatic heart disease and she underwent MV repair. She did okay for ~8y when she presented symptoms again. She believes at that time she was diagnosed with cardiomyopathy, she underwent re-do sternotomy with mechanical MVR followed by ICD placement. She has undergone multiple generators exchange since then. She believes she may had received an ICD shock 'many years ago[place] She did not seek medical help at that time.\par \par She lives with her family. She is currently unemployed, stopped working 1-2 years ago. Denies tobacco, illicit drug use or etoh use. She denies FH of heart disease. She is originally from  [place]. \par \par Today, she reports occasional stomach discomfort and dizziness which she attributes to the new medication Entresto. She denies CP, palpitations, near syncope/syncope, lower extremity edema and orthopnea. She continues to experience dyspnea on exertion walking 1 block however she can perform light exercises without significant limitation.  \par \par I reviewed home BP readings which ranged from 75-98/57-60. HR 70s. Recent lab results from 6/28/21 demonstrated stable renal function and electrolytes. Normal iron studies.

## 2021-07-12 LAB
ANION GAP SERPL CALC-SCNC: 11 MMOL/L
BUN SERPL-MCNC: 13 MG/DL
CALCIUM SERPL-MCNC: 9.7 MG/DL
CHLORIDE SERPL-SCNC: 104 MMOL/L
CO2 SERPL-SCNC: 24 MMOL/L
CREAT SERPL-MCNC: 0.81 MG/DL
FERRITIN SERPL-MCNC: 84 NG/ML
GLUCOSE SERPL-MCNC: 89 MG/DL
IRON SATN MFR SERPL: 20 %
IRON SERPL-MCNC: 70 UG/DL
NT-PROBNP SERPL-MCNC: 452 PG/ML
POTASSIUM SERPL-SCNC: 4.5 MMOL/L
SODIUM SERPL-SCNC: 140 MMOL/L
TIBC SERPL-MCNC: 350 UG/DL
UIBC SERPL-MCNC: 280 UG/DL

## 2021-07-15 LAB — INR PPP: 3.51

## 2021-07-21 ENCOUNTER — APPOINTMENT (OUTPATIENT)
Dept: CARDIOLOGY | Facility: CLINIC | Age: 51
End: 2021-07-21
Payer: MEDICAID

## 2021-07-21 VITALS
BODY MASS INDEX: 23.6 KG/M2 | WEIGHT: 125 LBS | HEIGHT: 61 IN | DIASTOLIC BLOOD PRESSURE: 69 MMHG | SYSTOLIC BLOOD PRESSURE: 103 MMHG | HEART RATE: 73 BPM | RESPIRATION RATE: 14 BRPM

## 2021-07-21 PROCEDURE — 93000 ELECTROCARDIOGRAM COMPLETE: CPT

## 2021-07-21 PROCEDURE — 99214 OFFICE O/P EST MOD 30 MIN: CPT

## 2021-08-03 ENCOUNTER — APPOINTMENT (OUTPATIENT)
Dept: HEART FAILURE | Facility: CLINIC | Age: 51
End: 2021-08-03
Payer: MEDICAID

## 2021-08-03 VITALS
TEMPERATURE: 98.6 F | OXYGEN SATURATION: 97 % | SYSTOLIC BLOOD PRESSURE: 118 MMHG | HEART RATE: 71 BPM | BODY MASS INDEX: 23.83 KG/M2 | HEIGHT: 61 IN | DIASTOLIC BLOOD PRESSURE: 70 MMHG | WEIGHT: 126.2 LBS

## 2021-08-03 PROCEDURE — 99214 OFFICE O/P EST MOD 30 MIN: CPT

## 2021-08-03 RX ORDER — WARFARIN SODIUM 1 MG/1
1 TABLET ORAL EVERY OTHER DAY
Refills: 0 | Status: DISCONTINUED | COMMUNITY
End: 2021-08-03

## 2021-08-03 RX ORDER — FUROSEMIDE 20 MG/1
20 TABLET ORAL DAILY
Qty: 30 | Refills: 3 | Status: DISCONTINUED | COMMUNITY
Start: 2021-06-16 | End: 2021-08-03

## 2021-08-03 NOTE — DISCUSSION/SUMMARY
[FreeTextEntry3] : 2 months with Hiral Carney NP [FreeTextEntry1] : 51 y/o female with h/o chronic systolic HF ACC/AHA stage C, NICMP/valvular LVEF 13% LVIDd 6cm, rheumatic heart disease s/p mitral valve repair '87 followed by mechanical MVR 1995, s/p ICD and chronic atrial fibrillation who was referred for HF care. She reports progressive MORALES, NYHA II-III. No symptoms to suggest congestion. No recent HF related hospitalizations. Clinically looks euvolemic/warm extremities. Last TTE showed dilated LV with LVEF 15% and normal functioning mechanical MVR. Needs CPET for risk stratification, once GDMT optimized.  \par Plan as above. \par

## 2021-08-03 NOTE — ASSESSMENT
[FreeTextEntry1] : 1.  EKG today reveals ventricular paced rhythm at 73 bpm.  Underlying rhythm is that of atrial fibrillation.  One PVC.  \par \par 2.  Valvular heart disease status-post mechanical MVR:  Clinically stable without chest pain or shortness of breath.  \par \par 3.  Review of recent bloodwork performed by Dr. Crockett reveals a BNP level of 452.  BUN 13, creatinine 0.81.  Patient advised to continue with current medications and follow a strict low-salt diet.  \par \par 4.  Chronic atrial fibrillation:  Patient clinically stable.  Remains on Coumadin with an INR range of 2.5 to 3.5 given her mechanical MVR.  Patient in range most of the time and compliant with therapy.  If clinically stable, follow up office visit three months. \par

## 2021-08-03 NOTE — HISTORY OF PRESENT ILLNESS
[FreeTextEntry1] : From a cardiac standpoint, Mrs. Dubon continues to do reasonably well denying exertional chest pain, shortness of breath, palpitations, or other symptoms.  She continues to take all of her medications well including Coumadin.

## 2021-08-03 NOTE — ASSESSMENT
[FreeTextEntry1] : # Chronic systolic HF ACC/AHA stage C, NYHA II-III\par NICMP/valvular LVEF 13% LVIDd 6cm\par Euolemic on exam, warm extremities\par GDMT: continue Toprol-XL 25mg daily, Entresto 24-26mg BID and Aldactone 25mg daily alternating with 50mg daily. Start Farxiga 10mg daily.\par Obtain BMP in 7-10 days\par Diuretics: STOP Lasix to 20mg daily\par Device: s/p ICD. follows with EP (Fabián Arredondo MD)\par Plan for CPx once on full GDMT, likely after next visit\par \par # Rheumatic heart disease \par H/o MV repair 1987 followed by mechanical MVR 1995\par AC: warfarin managed by Dr. Landers\par \par # Chronic atrial fibrillation\par Valvular\par Asymptomatic\par AC as above

## 2021-08-03 NOTE — REASON FOR VISIT
[Cardiac Failure] : cardiac failure [FreeTextEntry3] : Long Landers MD [FreeTextEntry1] : \par Cards: Long Landers MD

## 2021-08-03 NOTE — CARDIOLOGY SUMMARY
[de-identified] : \par 5/20/21 TTE LVIDd 6cm LVEF 13% LVOT VTI 23.5cm, mechanical MVR with normal function, mildly reduced RV systolic function, RVSP 22 mmHg, mod-sev TR\par \par 3/6/2018 TTE LVIDd 5.7cm LVEF 23% , normal functioning mechanical MVR, mildly reduced RV systolic function, RVSP 41 mmHg\par

## 2021-08-03 NOTE — PHYSICAL EXAM
[No Murmur] : no murmur [No Rub] : no rub [No Gallop] : no gallop [Normal] : normal gait [No Rash] : no rash [Alert and Oriented] : alert and oriented [de-identified] : MEchanical valve click

## 2021-08-03 NOTE — HISTORY OF PRESENT ILLNESS
[FreeTextEntry1] : 49 y/o female with h/o chronic systolic HF ACC/AHA stage C, NICMP/valvular LVEF 13% LVIDd 6cm, rheumatic heart disease s/p mitral valve repair '87 followed by mechanical MVR 1995, s/p ICD and chronic atrial fibrillation who presents for scheduled follow-up.\par \par She states her heart disease started in 1987 when she presented to the hospital with MORALES. She was found to have mitral disease associated to rheumatic heart disease and she underwent MV repair. She did okay for ~8y when she presented symptoms again. She believes at that time she was diagnosed with cardiomyopathy, she underwent re-do sternotomy with mechanical MVR followed by ICD placement. She has undergone multiple generators exchange since then. She believes she may had received an ICD shock 'many years ago' [place] the [place]. She did not seek medical help at that time.\par \par She lives with her family. She is currently unemployed, stopped working 1-2 years ago. Denies tobacco, illicit drug use or etoh use. She denies FH of heart disease. She is originally from  [place]. \par \par Today, she comes for routine follow up. She reports MORALES with moderate intensity activity. Otherwise denies orthopnea, PND, bendopnea, LE edema, chest discomfort, dizziness/lightheadedness, palpitations or syncope. Patient denies any recent ICD shocks. No recent hospitalizations or visits to the ED. Continues to exercise using [employer] videos x 35-40 mins. \par \par

## 2021-08-05 ENCOUNTER — RX CHANGE (OUTPATIENT)
Age: 51
End: 2021-08-05

## 2021-09-22 ENCOUNTER — APPOINTMENT (OUTPATIENT)
Dept: CARDIOLOGY | Facility: CLINIC | Age: 51
End: 2021-09-22

## 2021-10-13 ENCOUNTER — APPOINTMENT (OUTPATIENT)
Dept: HEART FAILURE | Facility: CLINIC | Age: 51
End: 2021-10-13
Payer: MEDICAID

## 2021-10-13 VITALS
HEIGHT: 61 IN | TEMPERATURE: 97.9 F | OXYGEN SATURATION: 100 % | DIASTOLIC BLOOD PRESSURE: 52 MMHG | WEIGHT: 125 LBS | BODY MASS INDEX: 23.6 KG/M2 | HEART RATE: 70 BPM | SYSTOLIC BLOOD PRESSURE: 86 MMHG

## 2021-10-13 PROCEDURE — 99213 OFFICE O/P EST LOW 20 MIN: CPT

## 2021-10-13 NOTE — DISCUSSION/SUMMARY
[___ Month(s)] : in [unfilled] month(s) [FreeTextEntry1] : 49 y/o female with h/o chronic systolic HF ACC/AHA stage C, NICMP/valvular (LVEF 13%, LVIDd 6cm), rheumatic heart disease s/p mitral valve repair '87 followed by mechanical MVR 1995, s/p ICD and chronic atrial fibrillation who was referred for HF care. \par \par TTE in May showed dilated LV with LVEF 15% and normal functioning mechanical MVR.\par \par Clinically, she is euvolemic with warm extremities. No overt changes in symptoms. Currently NYHA class II.  Will likely be able to tolerate Spironolactone 50mg daily but further optimization of GDMT may be limited by  BP. Will plan for CPET for risk stratification. \par \par

## 2021-10-13 NOTE — PHYSICAL EXAM
[Well Developed] : well developed [Well Nourished] : well nourished [No Acute Distress] : no acute distress [Normal Conjunctiva] : normal conjunctiva [Normal Venous Pressure] : normal venous pressure [No Murmur] : no murmur [No Rub] : no rub [No Gallop] : no gallop [Clear Lung Fields] : clear lung fields [Good Air Entry] : good air entry [Soft] : abdomen soft [Non Tender] : non-tender [Normal Gait] : normal gait [No Edema] : no edema [No Cyanosis] : no cyanosis [No Clubbing] : no clubbing [Moves all extremities] : moves all extremities [Alert and Oriented] : alert and oriented [de-identified] : RRR, Mechanical valve click [de-identified] : warm peripherally [de-identified] : warm, dry

## 2021-10-13 NOTE — CARDIOLOGY SUMMARY
[de-identified] : \par 5/20/21 TTE LVIDd 6cm LVEF 13% LVOT VTI 23.5cm, mechanical MVR with normal function, mildly reduced RV systolic function, RVSP 22 mmHg, mod-sev TR\par \par 3/6/2018 TTE LVIDd 5.7cm LVEF 23% , normal functioning mechanical MVR, mildly reduced RV systolic function, RVSP 41 mmHg\par

## 2021-10-13 NOTE — ASSESSMENT
[FreeTextEntry1] : # Chronic systolic HF ACC/AHA stage C, NYHA II\par NICMP/valvular LVEF 13% LVIDd 6cm\par Euolemic on exam, warm extremities\par GDMT: Instructed to take Spironolactone 50mg daily. Continue Toprol-XL 25mg daily, Entresto 24-26mg BID and Farxiga 10mg daily.\par Obtain BMP in 7-10 days\par Diuretics: Not indicated at this time\par Device: s/p ICD. follows with EP (Fabián Arredondo MD)\par Plan for CPx  \par \par # Rheumatic heart disease \par H/o MV repair 1987 followed by mechanical MVR 1995\par AC: warfarin managed by Dr. Landers\par \par # Chronic atrial fibrillation\par Valvular\par Asymptomatic\par AC as above

## 2021-10-13 NOTE — REASON FOR VISIT
[Cardiac Failure] : cardiac failure [FreeTextEntry3] : Long Landers MD [FreeTextEntry1] : \par Cards: Long Landers MD\par \par EP: Dr. Arredondo

## 2021-10-13 NOTE — HISTORY OF PRESENT ILLNESS
[FreeTextEntry1] : 49 y/o female with h/o chronic systolic HF ACC/AHA stage C, NICMP/valvular LVEF 13% LVIDd 6cm, rheumatic heart disease s/p mitral valve repair '87 followed by mechanical MVR 1995, s/p ICD and chronic atrial fibrillation who presents for scheduled follow-up.\par \par She states her heart disease started in 1987 when she presented to the hospital with MORALES. She was found to have mitral disease associated to rheumatic heart disease and she underwent MV repair. She did okay for ~8y when she presented symptoms again. She believes at that time she was diagnosed with cardiomyopathy, she underwent re-do sternotomy with mechanical MVR followed by ICD placement. She has undergone multiple generators exchange since then. She believes she may had received an ICD shock 'many years ago' while she was at the [place]. She did not seek medical help at that time.\par \par She lives with her family. She is currently unemployed, stopped working 1-2 years ago. Denies tobacco, illicit drug use or etoh use. She denies FH of heart disease. She is originally from  [place]. \par \par Today, she reports feeling generally well and offers no new complaints. She continues to experience dyspnea on moderate exertion when she exercises (able to complete ~35 min of exercise).\par \par Denies CP, sob, dizziness/LH, edema, orthopnea and PND.  She denies any recent hospitalizations or ICD shocks. \par \par

## 2021-10-29 ENCOUNTER — APPOINTMENT (OUTPATIENT)
Dept: CARDIOLOGY | Facility: CLINIC | Age: 51
End: 2021-10-29
Payer: MEDICAID

## 2021-10-29 ENCOUNTER — NON-APPOINTMENT (OUTPATIENT)
Age: 51
End: 2021-10-29

## 2021-10-29 VITALS
HEIGHT: 60 IN | SYSTOLIC BLOOD PRESSURE: 94 MMHG | HEART RATE: 70 BPM | DIASTOLIC BLOOD PRESSURE: 60 MMHG | BODY MASS INDEX: 24.35 KG/M2 | WEIGHT: 124 LBS | RESPIRATION RATE: 14 BRPM

## 2021-10-29 PROCEDURE — 99214 OFFICE O/P EST MOD 30 MIN: CPT

## 2021-10-29 PROCEDURE — 93000 ELECTROCARDIOGRAM COMPLETE: CPT

## 2021-10-29 NOTE — HISTORY OF PRESENT ILLNESS
[FreeTextEntry1] : Mrs. Dubon presents today feeling well.  Denies complaints of exertional chest pain, shortness of breath, palpitations, lightheadedness or syncope.  Since her last office visit she has been seen by Dr. Crockett and will follow up with her again in January.  Will be seeing Dr. Arredondo in December for her device check.  Continues with her exercise routine 4-5 times per week.

## 2021-10-29 NOTE — PHYSICAL EXAM
[Well Developed] : well developed [Well Nourished] : well nourished [No Acute Distress] : no acute distress [Normal Conjunctiva] : normal conjunctiva [Normal Venous Pressure] : normal venous pressure [No Carotid Bruit] : no carotid bruit [Normal S1, S2] : normal S1, S2 [No Rub] : no rub [No Gallop] : no gallop [Clear Lung Fields] : clear lung fields [No Respiratory Distress] : no respiratory distress  [Soft] : abdomen soft [Normal Bowel Sounds] : normal bowel sounds [Normal Gait] : normal gait [No Edema] : no edema [No Varicosities] : no varicosities [No Rash] : no rash [No Skin Lesions] : no skin lesions [Moves all extremities] : moves all extremities [No Focal Deficits] : no focal deficits [Normal Speech] : normal speech [Alert and Oriented] : alert and oriented [Normal memory] : normal memory [de-identified] : I-II/VI systolic murmur.  "Crisp" MV closure sound

## 2021-10-29 NOTE — REASON FOR VISIT
[FreeTextEntry1] : Mrs. Dubon is a pleasant 50-year-old  female with a past medical history significant for rheumatic fever/rheumatic heart disease status-post mitral valve repair in 1987 followed by mechanical MVR in 1995 as well as associated dilated cardiomyopathy with reduced left ventricular ejection fraction status-post AICD implantation and chronic atrial fibrillation who presents for follow up evaluation.  \par \par

## 2021-10-29 NOTE — DISCUSSION/SUMMARY
[FreeTextEntry1] : 1 - Valvular heart disease status-post mechanica MVR:  Clinically stable at this time.  Denies chest pain, shortness of breath, palpitations, ligththeadedness or syncope.\par \par 2 - Chronic atrial fibrillation:  Patient currently taking coumadin 6mg daily.  Her INRs are followed by her PCP.  Target INR range of 2.5-3.5 (mechanical MVR)\par \par 3 - Cardiomyopathy/AICD/chronic combined systolic and diastolic CHF:  stable at this time.  Being followed by Dr. Crockett.  Next scheduled visit with her is January 2022.  Will continue with metoprolol succinate 25mg daily, Entresto 24-26mg BID, spironolactone 25mg alternating 50mg every other day.  Will follow up with Dr. Arredondo in December for device check.\par \par 4 - Follow up with Dr. Landers in 3 months.\par \par

## 2021-11-17 ENCOUNTER — RX RENEWAL (OUTPATIENT)
Age: 51
End: 2021-11-17

## 2022-01-05 ENCOUNTER — RX RENEWAL (OUTPATIENT)
Age: 52
End: 2022-01-05

## 2022-01-07 ENCOUNTER — APPOINTMENT (OUTPATIENT)
Dept: CARDIOLOGY | Facility: CLINIC | Age: 52
End: 2022-01-07
Payer: MEDICAID

## 2022-01-07 VITALS
RESPIRATION RATE: 16 BRPM | WEIGHT: 126 LBS | DIASTOLIC BLOOD PRESSURE: 70 MMHG | SYSTOLIC BLOOD PRESSURE: 110 MMHG | HEART RATE: 70 BPM | BODY MASS INDEX: 24.74 KG/M2 | HEIGHT: 60 IN

## 2022-01-07 PROCEDURE — 99214 OFFICE O/P EST MOD 30 MIN: CPT

## 2022-01-07 PROCEDURE — 93000 ELECTROCARDIOGRAM COMPLETE: CPT

## 2022-01-11 NOTE — ASSESSMENT
[FreeTextEntry1] : 1.  EKG today reveals ventricular paced rhythm at 70 bpm.  \par \par 2.  Rheumatic dilated cardiomyopathy:  Clinically stable without chest pain and mild shortness of breath predominately with exertion.  Appears to be exercising at home on a regular basis without major difficulty.  Tolerating all current medications.  Clinically without evidence of volume overload.  \par \par 3.  Valvular heart disease:  Patient status-post mechanical MVR.  Clinically stable without symptoms.  Tolerating Warfarin therapy well.  \par \par 4.  AICD surveillance:  This is being followed by the [place] EP team.  No issues thus far.  \par \par 5.  If clinically stable, patient to undergo fasting bloodwork and an echocardiogram prior to follow up office visit in three months.  Encouraged to follow up with Dr. Crockett as well.   \par

## 2022-01-11 NOTE — REASON FOR VISIT
[FreeTextEntry1] : Mrs. Dubon is a pleasant 51-year-old  female with a past medical history significant for rheumatic fever/rheumatic heart disease status-post mitral valve repair in 1987 followed by mechanical MVR in 1995, as well as associated dilated cardiomyopathy with reduced left ventricular ejection fraction status-post AICD implantation and chronic atrial fibrillation who presents for follow up evaluation.  \par

## 2022-01-11 NOTE — HISTORY OF PRESENT ILLNESS
[FreeTextEntry1] :  From a cardiac standpoint, Mrs. Dubon is doing well denying exertional chest pain, shortness of breath, or other cardiac symptoms.  She is currently being followed by the advanced heart failure team at [facility].

## 2022-01-18 ENCOUNTER — RX RENEWAL (OUTPATIENT)
Age: 52
End: 2022-01-18

## 2022-01-19 ENCOUNTER — NON-APPOINTMENT (OUTPATIENT)
Age: 52
End: 2022-01-19

## 2022-01-19 ENCOUNTER — APPOINTMENT (OUTPATIENT)
Dept: CARDIOLOGY | Facility: CLINIC | Age: 52
End: 2022-01-19
Payer: MEDICAID

## 2022-01-19 ENCOUNTER — APPOINTMENT (OUTPATIENT)
Dept: HEART FAILURE | Facility: CLINIC | Age: 52
End: 2022-01-19
Payer: MEDICAID

## 2022-01-19 VITALS
TEMPERATURE: 98.2 F | WEIGHT: 124.2 LBS | DIASTOLIC BLOOD PRESSURE: 70 MMHG | HEART RATE: 75 BPM | BODY MASS INDEX: 24.39 KG/M2 | SYSTOLIC BLOOD PRESSURE: 116 MMHG | OXYGEN SATURATION: 98 % | RESPIRATION RATE: 14 BRPM | HEIGHT: 60 IN

## 2022-01-19 PROCEDURE — 93306 TTE W/DOPPLER COMPLETE: CPT

## 2022-01-19 PROCEDURE — 99213 OFFICE O/P EST LOW 20 MIN: CPT

## 2022-01-19 PROCEDURE — 93000 ELECTROCARDIOGRAM COMPLETE: CPT

## 2022-01-19 RX ORDER — SPIRONOLACTONE 25 MG/1
25 TABLET ORAL
Qty: 45 | Refills: 6 | Status: DISCONTINUED | COMMUNITY
Start: 2018-11-19 | End: 2022-01-19

## 2022-01-19 NOTE — HISTORY OF PRESENT ILLNESS
[FreeTextEntry1] : 52 y/o female with h/o chronic systolic HF ACC/AHA stage C, NICMP/valvular LVEF 13% LVIDd 6cm, rheumatic heart disease s/p mitral valve repair '87 followed by mechanical MVR 1995, s/p ICD and chronic atrial fibrillation who presents for scheduled follow-up.\par \par She states her heart disease started in 1987 when she presented to the hospital with MORALES. She was found to have mitral disease associated to rheumatic heart disease and she underwent MV repair. She did okay for ~8y when she presented symptoms again. She believes at that time she was diagnosed with cardiomyopathy, she underwent re-do sternotomy with mechanical MVR followed by ICD placement. She has undergone multiple generators exchange since then. She believes she may had received an ICD shock 'many years ago' while she was at the [place]. She did not seek medical help at that time.\par \par She lives with her family. She is currently unemployed, stopped working 1-2 years ago. Denies tobacco, illicit drug use or etoh use. She denies FH of heart disease. She is originally from  [place]. \par \par Today, she reports feeling well and denies any overt HF symptoms. She occasionally experiences short episodes of dizziness. She continues to remain active and follow exercise videos but does note dyspnea on at least moderate exertion (able to complete ~35 min of exercise).\par \par She denies any recent hospitalizations or ICD shocks. \par \par

## 2022-01-19 NOTE — CARDIOLOGY SUMMARY
[de-identified] : \par 1/19/22: Paced rhythm, underlying atrial fibrillation [de-identified] : \par 5/20/21 TTE LVIDd 6cm LVEF 13% LVOT VTI 23.5cm, mechanical MVR with normal function, mildly reduced RV systolic function, RVSP 22 mmHg, mod-sev TR\par \par 3/6/2018 TTE LVIDd 5.7cm LVEF 23% , normal functioning mechanical MVR, mildly reduced RV systolic function, RVSP 41 mmHg\par

## 2022-01-19 NOTE — PHYSICAL EXAM
[Well Developed] : well developed [No Acute Distress] : no acute distress [Normal Venous Pressure] : normal venous pressure [No Murmur] : no murmur [No Gallop] : no gallop [Clear Lung Fields] : clear lung fields [Good Air Entry] : good air entry [No Respiratory Distress] : no respiratory distress  [Soft] : abdomen soft [Normal Gait] : normal gait [No Edema] : no edema [No Cyanosis] : no cyanosis [No Clubbing] : no clubbing [Moves all extremities] : moves all extremities [Alert and Oriented] : alert and oriented [de-identified] : RRR, Mechanical valve click [de-identified] : warm peripherally [de-identified] : warm, dry

## 2022-01-19 NOTE — DISCUSSION/SUMMARY
[Patient] : the patient [___ Month(s)] : in [unfilled] month(s) [With ___] : with [unfilled] [FreeTextEntry1] : 50 y/o female with h/o chronic systolic HF ACC/AHA stage C, NICMP/valvular (LVEF 13%, LVIDd 6cm), rheumatic heart disease s/p mitral valve repair '87 followed by mechanical MVR 1995, s/p ICD and chronic atrial fibrillation who was referred for HF care. \par \par TTE in May showed dilated LV with LVEF 15% and normal functioning mechanical MVR.\par \par Repeat TTE today preliminarily demonstrates LVEF 30%, significant LAE, mildly reduced RV function, mechanical MVR, moderate TR.\par \par Clinically, she appears euvolemic with warm extremities. No overt changes in symptoms. Currently NYHA class II. Tolerating good GDMT however uptitration of Entresto will likely be limited by low BP/dizziness. Will plan for CPET for risk stratification.  \par

## 2022-01-19 NOTE — ASSESSMENT
[FreeTextEntry1] : # Chronic systolic HF ACC/AHA stage C, NYHA II\par NICMP/valvular LVEF 18%% LVIDd 6cm\par Euolemic on exam, warm extremities\par GDMT: Continue Toprol-XL 25mg daily, Entresto 24-26mg BID, Spironolactone 50mg daily and Farxiga 10mg daily.\par Labs from 10/28/21 reviewed and demonstrated normal renal function and electrolytes.\par Diuretics: Not indicated at this time\par Device: s/p ICD. follows with EP (Fabián Arredondo MD)\par Plan for CPx  \par \par # Rheumatic heart disease \par H/o MV repair 1987 followed by mechanical MVR 1995\par AC: warfarin managed by Dr. Landers\par \par # Chronic atrial fibrillation\par Valvular\par Asymptomatic\par AC as above

## 2022-02-08 ENCOUNTER — RX RENEWAL (OUTPATIENT)
Age: 52
End: 2022-02-08

## 2022-03-16 LAB — INR PPP: 4.8

## 2022-03-24 ENCOUNTER — APPOINTMENT (OUTPATIENT)
Dept: CARDIOLOGY | Facility: CLINIC | Age: 52
End: 2022-03-24

## 2022-04-06 ENCOUNTER — APPOINTMENT (OUTPATIENT)
Dept: HEART FAILURE | Facility: CLINIC | Age: 52
End: 2022-04-06
Payer: MEDICAID

## 2022-04-06 VITALS
DIASTOLIC BLOOD PRESSURE: 58 MMHG | OXYGEN SATURATION: 99 % | WEIGHT: 129 LBS | HEART RATE: 90 BPM | HEIGHT: 60 IN | SYSTOLIC BLOOD PRESSURE: 88 MMHG | TEMPERATURE: 99.1 F | BODY MASS INDEX: 25.32 KG/M2

## 2022-04-06 PROCEDURE — 99214 OFFICE O/P EST MOD 30 MIN: CPT

## 2022-04-06 NOTE — DISCUSSION/SUMMARY
[Patient] : the patient [Benefits] : benefits [___ Month(s)] : in [unfilled] month(s) [With ___] : with [unfilled] [FreeTextEntry1] : 50 y/o female with h/o chronic systolic HF ACC/AHA stage C, NICMP/valvular (LVEF 13%, LVIDd 6cm), rheumatic heart disease s/p mitral valve repair '87 followed by mechanical MVR 1995, s/p ICD and chronic atrial fibrillation who was referred for HF care. \par \par TTE in January revealed slight improvement in LVEF to 30-35%, significant LAE, mildly reduced RV function, mechanical MVR with trace MR (MG 3mmHg), moderate TR, RVSP 25.7mmHg.\par \par Clinically, she appears euvolemic with warm extremities. No overt changes in symptoms. Currently NYHA class II. Tolerating good GDMT however uptitration of Entresto is limited by low BP (pt currently denies symptoms). Will plan for CPET for risk stratification. Plan as above.\par

## 2022-04-06 NOTE — HISTORY OF PRESENT ILLNESS
[FreeTextEntry1] : 52 y/o female with h/o chronic systolic HF ACC/AHA stage C, NICMP/valvular LVEF 13% LVIDd 6cm, rheumatic heart disease s/p mitral valve repair '87 followed by mechanical MVR 1995, s/p ICD and chronic atrial fibrillation who presents for scheduled follow-up.\par \par She states her heart disease started in 1987 when she presented to the hospital with MORALES. She was found to have mitral disease associated to rheumatic heart disease and she underwent MV repair. She did okay for ~8y when she presented symptoms again. She believes at that time she was diagnosed with cardiomyopathy, she underwent re-do sternotomy with mechanical MVR followed by ICD placement. She has undergone multiple generators exchange since then. She believes she may had received an ICD shock 'many years ago' while she was at the [place]. She did not seek medical help at that time.\par \par She lives with her family. She is currently unemployed, stopped working 1-2 years ago. Denies tobacco, illicit drug use or etoh use. She denies FH of heart disease. [place] is originally from  [place]. \par \par Today, she reports feeling well and denies any overt HF symptoms. She specifically denies CP, palpitatons, SOB at rest, dizziness/LH, edema, orthopnea and PND. She continues to remain active and follow exercise videos but does note dyspnea on at least moderate exertion (able to complete ~35 min of exercise).\par \par She denies any recent hospitalizations or ICD shocks. \par \par

## 2022-04-06 NOTE — ASSESSMENT
[FreeTextEntry1] : # Chronic systolic HF ACC/AHA stage C, NYHA II\par NICMP/valvular LVEF 18%% LVIDd 6cm\par Euvolemic on exam, warm extremities\par GDMT: Continue Toprol-XL 25mg daily, Entresto 24-26mg BID, Spironolactone 50mg daily and Farxiga 10mg daily.\par Labs from 3/3/22 reviewed and demonstrated normal renal function and electrolytes.\par Diuretics: Not indicated at this time\par Device: s/p ICD. follows with EP (Fabián Arredondo MD)\par Plan for CPx in our [place] office next month\par \par # Rheumatic heart disease \par H/o MV repair 1987 followed by mechanical MVR 1995\par AC: Warfarin managed by PCP. Advised to f/u as last INR documented was elevated - pt. denies bleeding.\par \par # Chronic atrial fibrillation\par Valvular\par Asymptomatic\par AC as above

## 2022-04-06 NOTE — CARDIOLOGY SUMMARY
[de-identified] : \par 1/19/22: Paced rhythm, underlying atrial fibrillation [de-identified] : \par 1/19/22 TTE: LVEF ~30%, LVIDd 5.25cm, significant LAE, mildly reduced RV function, mechanical MVR with trace MR (MG 3mmHg), moderate TR, RVSP 25.7mmHg.\par \par \par 5/20/21 TTE LVIDd 6cm LVEF 13% LVOT VTI 23.5cm, mechanical MVR with normal function, mildly reduced RV systolic function, RVSP 22 mmHg, mod-sev TR\par \par 3/6/2018 TTE: LVIDd 5.7cm LVEF 23% , normal functioning mechanical MVR, mildly reduced RV systolic function, RVSP 41 mmHg\par

## 2022-04-06 NOTE — REASON FOR VISIT
[Cardiac Failure] : cardiac failure [FreeTextEntry3] : Long Landers MD [FreeTextEntry1] : \par Cards: Dr. Landers\par \par EP: Dr. Arredondo

## 2022-04-12 ENCOUNTER — APPOINTMENT (OUTPATIENT)
Dept: CARDIOLOGY | Facility: CLINIC | Age: 52
End: 2022-04-12
Payer: MEDICAID

## 2022-04-12 VITALS
RESPIRATION RATE: 14 BRPM | DIASTOLIC BLOOD PRESSURE: 62 MMHG | SYSTOLIC BLOOD PRESSURE: 96 MMHG | HEART RATE: 70 BPM | HEIGHT: 60 IN | WEIGHT: 130 LBS | BODY MASS INDEX: 25.52 KG/M2

## 2022-04-12 LAB — INR PPP: 4.5

## 2022-04-12 PROCEDURE — 99214 OFFICE O/P EST MOD 30 MIN: CPT

## 2022-04-12 PROCEDURE — 93000 ELECTROCARDIOGRAM COMPLETE: CPT

## 2022-04-14 NOTE — ASSESSMENT
[FreeTextEntry1] : 1.  EKG today reveals ventricular paced rhythm at 70 bpm.  \par \par 2.  Cardiomyopathy with marked left ventricular dysfunction:  Patient with noted improvement in left ventricular function over the last 9 months.  Last echocardiogram revealed ejection fraction somewhere around 30%.  I have advised patient to continue following a low-salt diet as well as current medications and to follow up with the advanced heart failure team as well.  \par \par 3.  INR today 4.5.  Patient advised to hold Coumadin for one day and continue same dose.  Will undergo follow up PT/INR next week. \par \par 4.  Patient advised on regular aerobic exercise.  Follow up bloodwork and an office visit in six weeks. \par

## 2022-04-14 NOTE — HISTORY OF PRESENT ILLNESS
[FreeTextEntry1] : Mrs. Dubon presents today without complaints of exertional chest pain or shortness of breath.  She does admit to occasional lightheadedness likely secondary to her borderline blood pressure.  She appears to be tolerating current medications well. \par \par

## 2022-04-25 LAB — INR PPP: 4.8

## 2022-05-24 ENCOUNTER — NON-APPOINTMENT (OUTPATIENT)
Age: 52
End: 2022-05-24

## 2022-05-24 ENCOUNTER — APPOINTMENT (OUTPATIENT)
Dept: CARDIOLOGY | Facility: CLINIC | Age: 52
End: 2022-05-24
Payer: MEDICAID

## 2022-05-24 VITALS
DIASTOLIC BLOOD PRESSURE: 64 MMHG | HEIGHT: 60 IN | HEART RATE: 70 BPM | SYSTOLIC BLOOD PRESSURE: 100 MMHG | WEIGHT: 127 LBS | RESPIRATION RATE: 14 BRPM | BODY MASS INDEX: 24.94 KG/M2

## 2022-05-24 PROCEDURE — 99214 OFFICE O/P EST MOD 30 MIN: CPT | Mod: 25

## 2022-05-24 PROCEDURE — 93000 ELECTROCARDIOGRAM COMPLETE: CPT

## 2022-05-24 RX ORDER — FLUCONAZOLE 150 MG/1
150 TABLET ORAL
Qty: 1 | Refills: 0 | Status: DISCONTINUED | COMMUNITY
Start: 2022-01-05

## 2022-05-24 RX ORDER — CLOTRIMAZOLE 10 MG/G
1 CREAM TOPICAL
Qty: 60 | Refills: 0 | Status: DISCONTINUED | COMMUNITY
Start: 2022-04-11

## 2022-05-24 NOTE — DISCUSSION/SUMMARY
[FreeTextEntry1] : 1 - Cardiomyopathy with marked left ventricular dysfunction:  presently without shortness of breath, palpitations or lightheadedness.  Will continue with Entresto 24/26mg BID, metoprolol succinate 25mg daily and Spironolactone 25mg daily.  Has follow up appointment with Dr. Crockett 6/14/2022.\par \par 2 - Chest pain/pressure:  presents today with complaints of intermittent mid-sternal chest discomfort/pressure, non-radiating.  Occurs mostly at rest and can last 10-15 minutes at a time.  Had one episode of mid-back discomfort one morning after waking up in the morning.  Took Aleve with minimal relief.  Several hours later the pain resolved.  Will schedule Mrs. Dubon for a 1-day pharmacologic nuclear stress test.\par \par 3 - Chronic atrial fibrillation/Status-post mechanical AVR:  today's INR is 4.  Instructed to hold coumadin today and resume coumadin 5mg daily tomorrow.  Will recheck INR in one week at the lab.\par \par 4 - FOllow up with Dr. Landers after testing is completed.

## 2022-05-24 NOTE — HISTORY OF PRESENT ILLNESS
[FreeTextEntry1] : Mrs. Dubon presents today with complaints of intermittent mid-sternal chest discomfort/pressure, non-radiating.  Occurs mostly at rest and can last 10-15 minutes at a time.  Had one episode of mid-back discomfort one morning after waking up in the morning.  Took Aleve with minimal relief.  Several hours later the pain resolved.  Denies shortness of breath, palpitations or lightheadedness.

## 2022-05-24 NOTE — REASON FOR VISIT
[FreeTextEntry1] : Mrs. Dubon is a pleasant 51-year-old  female with a past medical history significant for rheumatic fever/rheumatic heart disease status-post mitral valve repair in 1987 followed by mechanical MVR in 1995, as well as associated dilated cardiomyopathy with reduced left ventricular ejection fraction status-post AICD implantation and a history of chronic atrial fibrillation who presents for follow up evaluation.  \par

## 2022-05-24 NOTE — PHYSICAL EXAM
[Well Developed] : well developed [Well Nourished] : well nourished [No Acute Distress] : no acute distress [Normal Conjunctiva] : normal conjunctiva [Normal Venous Pressure] : normal venous pressure [No Carotid Bruit] : no carotid bruit [Normal S1, S2] : normal S1, S2 [No Rub] : no rub [No Gallop] : no gallop [Clear Lung Fields] : clear lung fields [No Respiratory Distress] : no respiratory distress  [Soft] : abdomen soft [Normal Bowel Sounds] : normal bowel sounds [Normal Gait] : normal gait [No Edema] : no edema [No Rash] : no rash [No Skin Lesions] : no skin lesions [Moves all extremities] : moves all extremities [No Focal Deficits] : no focal deficits [Normal Speech] : normal speech [Alert and Oriented] : alert and oriented [Normal memory] : normal memory [de-identified] : I-II/VI systolic murmur

## 2022-05-25 LAB
INR PPP: 4
INR PPP: 5.1

## 2022-06-14 ENCOUNTER — APPOINTMENT (OUTPATIENT)
Dept: HEART FAILURE | Facility: CLINIC | Age: 52
End: 2022-06-14
Payer: MEDICAID

## 2022-06-14 VITALS
DIASTOLIC BLOOD PRESSURE: 54 MMHG | OXYGEN SATURATION: 98 % | HEART RATE: 69 BPM | HEIGHT: 60 IN | WEIGHT: 125 LBS | SYSTOLIC BLOOD PRESSURE: 96 MMHG | BODY MASS INDEX: 24.54 KG/M2 | TEMPERATURE: 99 F

## 2022-06-14 PROCEDURE — 99213 OFFICE O/P EST LOW 20 MIN: CPT

## 2022-06-14 NOTE — DISCUSSION/SUMMARY
[Patient] : the patient [Benefits] : benefits [___ Month(s)] : in [unfilled] month(s) [With ___] : with [unfilled] [FreeTextEntry1] : 52 y/o female with h/o chronic systolic HF ACC/AHA stage C, NICMP/valvular (LVEF 13%, LVIDd 6cm), rheumatic heart disease s/p mitral valve repair '87 followed by mechanical MVR 1995, s/p ICD and chronic atrial fibrillation who was referred for HF care. \par \par TTE in January revealed slight improvement in LVEF to 30-35%, significant LAE, mildly reduced RV function, mechanical MVR with trace MR (MG 3mmHg), moderate TR, RVSP 25.7mmHg.\par \par Clinically, she appears euvolemic with warm extremities. No overt changes in symptoms. Currently NYHA class II. Tolerating good GDMT however uptitration of Entresto is limited by low normal BP. Will plan for eventual CPET for risk stratification as long as her upcoming nuclear stress test is unremarkable. Plan as above.\par

## 2022-06-14 NOTE — ASSESSMENT
[FreeTextEntry1] : # Chronic systolic HF ACC/AHA stage C, NYHA II\par NICMP/valvular LVEF now 30% from 18%, LVIDd 5.25cm from 6cm\par Euvolemic on exam, warm extremities\par GDMT: Continue Toprol-XL 25mg daily, Entresto 24-26mg BID, Spironolactone 50mg daily and Farxiga 10mg daily.\par Labs from 4/28/22 reviewed and demonstrated normal renal function and electrolytes.pBNP 230.\par Diuretics: Not indicated at this time\par Device: s/p ICD. follows with EP (Fabián Arredondo MD)\par Patient planning to undergo nuclear stress testing next week. If unremarkable will plan for eventual CPET in our [place] office.\par \par # Rheumatic heart disease \par H/o MV repair 1987 followed by mechanical MVR 1995\par AC: Warfarin managed by Primary Cardiologist.\par \par # Chronic atrial fibrillation\par Valvular\par Asymptomatic\par AC as above

## 2022-06-14 NOTE — CARDIOLOGY SUMMARY
[de-identified] : \par 5/24/22: Paced ventricular rhythm, HR 70bpm\par \par 1/19/22: Paced rhythm, underlying atrial fibrillation [de-identified] : \par 1/19/22 TTE: LVEF ~30%, LVIDd 5.25cm, significant LAE, mildly reduced RV function, mechanical MVR with trace MR (MG 3mmHg), moderate TR, RVSP 25.7mmHg.\par \par \par 5/20/21 TTE LVIDd 6cm LVEF 13% LVOT VTI 23.5cm, mechanical MVR with normal function, mildly reduced RV systolic function, RVSP 22 mmHg, mod-sev TR\par \par 3/6/2018 TTE: LVIDd 5.7cm LVEF 23% , normal functioning mechanical MVR, mildly reduced RV systolic function, RVSP 41 mmHg\par

## 2022-06-14 NOTE — HISTORY OF PRESENT ILLNESS
[FreeTextEntry1] : 52 y/o female with h/o chronic systolic HF ACC/AHA stage C, NICMP/valvular LVEF 13% LVIDd 6cm, rheumatic heart disease s/p mitral valve repair '87 followed by mechanical MVR 1995, s/p ICD and chronic atrial fibrillation who presents for scheduled follow-up.\par \par She states her heart disease started in 1987 when she presented to the hospital with MORALES. She was found to have mitral disease associated to rheumatic heart disease and she underwent MV repair. She did okay for ~8y when she presented symptoms again. She believes at that time she was diagnosed with cardiomyopathy, she underwent re-do sternotomy with mechanical MVR followed by ICD placement. She has undergone multiple generators exchange since then. She believes she may had received an ICD shock 'many years ago' while she was at the [place]. She did not seek medical help at that time.\par \par She lives with her family. She is currently unemployed, stopped working ~2 years ago. Denies tobacco, illicit drug use or etoh use. She denies FH of heart disease. She is originally from  [place]. \par \par Today, she reports feeling well and denies any overt HF symptoms. She continues to remain active and follow exercise videos but does note dyspnea on at least moderate exertion (able to complete ~35 min of exercise). She had experienced some atypical chest discomfort at rest for which she is planning to undergo nuclear stress testing at Dr. Landers's office next week.\par \par She denies any recent hospitalizations or ICD shocks. \par \par

## 2022-06-14 NOTE — PHYSICAL EXAM
[Well Developed] : well developed [No Acute Distress] : no acute distress [Normal Conjunctiva] : normal conjunctiva [Normal Venous Pressure] : normal venous pressure [No Murmur] : no murmur [No Gallop] : no gallop [Clear Lung Fields] : clear lung fields [Good Air Entry] : good air entry [No Respiratory Distress] : no respiratory distress  [Soft] : abdomen soft [No Edema] : no edema [No Cyanosis] : no cyanosis [No Clubbing] : no clubbing [Moves all extremities] : moves all extremities [Alert and Oriented] : alert and oriented [de-identified] : RRR, Mechanical valve click [de-identified] : did not assess gait [de-identified] : warm peripherally [de-identified] : warm, dry

## 2022-06-22 ENCOUNTER — APPOINTMENT (OUTPATIENT)
Dept: CARDIOLOGY | Facility: CLINIC | Age: 52
End: 2022-06-22
Payer: MEDICAID

## 2022-06-22 ENCOUNTER — MED ADMIN CHARGE (OUTPATIENT)
Age: 52
End: 2022-06-22

## 2022-06-22 LAB — INR PPP: 5.8

## 2022-06-22 PROCEDURE — A9500: CPT

## 2022-06-22 PROCEDURE — 93015 CV STRESS TEST SUPVJ I&R: CPT

## 2022-06-22 PROCEDURE — 78452 HT MUSCLE IMAGE SPECT MULT: CPT

## 2022-06-22 RX ORDER — REGADENOSON 0.08 MG/ML
0.4 INJECTION, SOLUTION INTRAVENOUS
Qty: 4 | Refills: 0 | Status: COMPLETED | OUTPATIENT
Start: 2022-06-22

## 2022-06-22 RX ADMIN — REGADENOSON 5 MG/5ML: 0.08 INJECTION, SOLUTION INTRAVENOUS at 00:00

## 2022-07-01 LAB — INR PPP: 3

## 2022-07-21 ENCOUNTER — RESULT CHARGE (OUTPATIENT)
Age: 52
End: 2022-07-21

## 2022-07-22 ENCOUNTER — APPOINTMENT (OUTPATIENT)
Dept: CARDIOLOGY | Facility: CLINIC | Age: 52
End: 2022-07-22

## 2022-07-22 VITALS
RESPIRATION RATE: 14 BRPM | HEART RATE: 70 BPM | HEIGHT: 61 IN | SYSTOLIC BLOOD PRESSURE: 104 MMHG | DIASTOLIC BLOOD PRESSURE: 60 MMHG | BODY MASS INDEX: 24.73 KG/M2 | WEIGHT: 131 LBS

## 2022-07-22 LAB — INR PPP: 4.5

## 2022-07-22 PROCEDURE — 99214 OFFICE O/P EST MOD 30 MIN: CPT | Mod: 25

## 2022-07-22 PROCEDURE — 93000 ELECTROCARDIOGRAM COMPLETE: CPT

## 2022-07-26 NOTE — HISTORY OF PRESENT ILLNESS
[FreeTextEntry1] : From a cardiac standpoint, Mrs. Dubon denies exertional chest pain, shortness of breath.  She remains euvolemic and well-compensated.  She has been having occasional palpitations.  These have not resulted in lightheadedness or syncope.  Her AICD is currently being monitored by cardiology at [place].    \par

## 2022-07-26 NOTE — ASSESSMENT
[FreeTextEntry1] : 1.  EKG today reveals ventricular paced rhythm at 70 bpm.  \par \par 2.  Patient recently presented to the office with complaints of intermittent mid-sternal chest discomfort described as a pressure-like sensation which was nonradiating.  Most of her symptoms occurred at rest and rarely with exertion.  She underwent a nuclear stress test to further assess these complaints.  Pharmacologic nuclear stress testing revealed no evidence of reversible ischemia.  A large fixed inferior defect is noted which is marred by diaphragmatic attenuation artifact as well as soft tissue attenuation artifact.  I do not believe this represents true infarct, however, may well represent myocardial injury during her multiple surgical procedures.  Patient reassured and advised to continue all current medications. \par \par 3.  Chronic atrial fibrillation:  Patient presents today with an INR of 4.5.  Currently alternating Warfarin 4 with 5 mg on an every-other-day basis.  Will have her hold Coumadin for one day and switch to 4.5 mg daily.  Will follow this closely and adjust accordingly.  PT/INR in 7-10 days.  \par \par 4.  Patient advised to follow up with advanced heart failure team and proceed with additional testing.  If clinically stable from a cardiac standpoint, office visit two months.   \par  \par

## 2022-08-10 LAB — INR PPP: 5.3

## 2022-08-16 ENCOUNTER — APPOINTMENT (OUTPATIENT)
Dept: HEART FAILURE | Facility: CLINIC | Age: 52
End: 2022-08-16

## 2022-08-16 VITALS
SYSTOLIC BLOOD PRESSURE: 90 MMHG | TEMPERATURE: 99 F | OXYGEN SATURATION: 98 % | WEIGHT: 129 LBS | HEART RATE: 72 BPM | HEIGHT: 61 IN | BODY MASS INDEX: 24.35 KG/M2 | DIASTOLIC BLOOD PRESSURE: 54 MMHG

## 2022-08-16 PROCEDURE — 99214 OFFICE O/P EST MOD 30 MIN: CPT

## 2022-08-19 NOTE — HISTORY OF PRESENT ILLNESS
[FreeTextEntry1] : 52 y/o female with h/o chronic systolic HF ACC/AHA stage C, NICMP/valvular LVEF 13% LVIDd 6cm, rheumatic heart disease s/p mitral valve repair '87 followed by mechanical MVR 1995, s/p ICD '10 upgraded to BI-V ICD '13, RV lead fx s/p extraction+reimplantation c/b LV lead dislodgement+reimplantation '14, CRT-D gen exchanged+recanalization left subclavian vein '19([facility]) and chronic atrial fibrillation who presents for scheduled follow-up.\par \par She states her heart disease started in 1987 when she presented to the hospital with MORALES. She was found to have mitral disease associated to rheumatic heart disease and she underwent MV repair. She did okay for ~8y when she presented symptoms again. She believes at that time she was diagnosed with cardiomyopathy, she underwent re-do sternotomy with mechanical MVR followed by ICD placement. She has undergone multiple generators exchange since then. She believes she may had received an [place] She did not seek medical help at that time.\par \par She lives with her family. She is currently unemployed, stopped working ~2 years ago. Denies tobacco, illicit drug use or etoh use. She denies FH of heart disease. She is originally from  [place]. \par \par Today, she presents for routine follow up. She refers she has noted worsening functional class. She was able to do cardio/aerobic exercise x 10 mins about a month ago. She now gets tired. She had a visit with Dr. Landers in the past due to atypical chest pain. She underwent nuc pharm stress which demonstrated ?large fixed defect which was considered artifact. LVEF was reported as 43%. \par

## 2022-08-19 NOTE — ASSESSMENT
[FreeTextEntry1] : # Chronic systolic HF ACC/AHA stage C, NYHA II-III\par NICMP/valvular LVEF now 30% from 18%, LVIDd 5.25cm from 6cm\par Euvolemic on exam, warm extremities\par GDMT: Continue Toprol-XL 25mg daily, Entresto 24-26mg BID, Spironolactone 50mg daily and Farxiga 10mg daily.\par Unable to further uptitrate due to low BP\par Diuretics: Not indicated at this time\par Device: s/p Bi-V. follows with EP (Fabián Arredondo MD)\par Will obtain labs A1c, CBC, CMP, Mag and ProBNP\par Cardiac rehab referral with plan for CPET in 6-8 weeks\par \par # Rheumatic heart disease \par H/o MV repair 1987 followed by mechanical MVR 1995\par AC: Warfarin managed by Primary Cardiologist.\par \par # Chronic atrial fibrillation\par Valvular\par Asymptomatic\par AC as above

## 2022-08-19 NOTE — PHYSICAL EXAM
[No Murmur] : no murmur [No Rub] : no rub [No Gallop] : no gallop [Normal] : normal gait [No Edema] : no edema [Alert and Oriented] : alert and oriented [de-identified] : Mechanical valve click noted

## 2022-08-19 NOTE — DISCUSSION/SUMMARY
[Patient] : the patient [Benefits] : benefits [___ Month(s)] : in [unfilled] month(s) [With ___] : with [unfilled] [FreeTextEntry1] : 52 y/o female with h/o chronic systolic HF ACC/AHA stage C, NICMP/valvular LVEF 13% LVIDd 6cm, rheumatic heart disease s/p mitral valve repair '87 followed by mechanical MVR 1995, s/p ICD '10 upgraded to BI-V ICD '13, RV lead fx s/p extraction+reimplantation c/b LV lead dislodgement+reimplantation '14, CRT-D gen exchanged+recanalization left subclavian vein '19([facility]) and chronic atrial fibrillation who presents for scheduled follow-up. She reports changes in her functional class. She had an epsiode of atypical chest pain. She's clinically euvolemic, warm extremities. BP remains borderline low which limits further GDMT optimization. A nuc pharm stress was done and no evidence of ischemia was noted. Givne her symptoms and inability of uptitrate GDMT, I'm concern about advanced HF. Will refer to cardiac rehab and perform CPET. Interetsingly, her nuc stress reported LVEF 43%. Her last echo in January reported LVEF 30%. \par Plan as above.

## 2022-08-19 NOTE — CARDIOLOGY SUMMARY
[de-identified] : \par 5/24/22: Paced ventricular rhythm, HR 70bpm\par \par 1/19/22: Paced rhythm, underlying atrial fibrillation [de-identified] : 6/22/22 Nuc pharm stress: large sized, moderate intensity, fixed defect of the entire inferior/inferoseptal wall consistent with infarction, diaphragmatic attenuation artifact and soft tissue attenuation artifact. LVEF 43%, severe hypokinesis basal-mid inferior wall.  [de-identified] : \par 1/19/22 TTE: LVEF ~30%, LVIDd 5.25cm, significant LAE, mildly reduced RV function, mechanical MVR with trace MR (MG 3mmHg), moderate TR, RVSP 25.7mmHg.\par \par \par 5/20/21 TTE LVIDd 6cm LVEF 13% LVOT VTI 23.5cm, mechanical MVR with normal function, mildly reduced RV systolic function, RVSP 22 mmHg, mod-sev TR\par \par 3/6/2018 TTE: LVIDd 5.7cm LVEF 23% , normal functioning mechanical MVR, mildly reduced RV systolic function, RVSP 41 mmHg\par

## 2022-09-12 ENCOUNTER — RX CHANGE (OUTPATIENT)
Age: 52
End: 2022-09-12

## 2022-09-12 LAB — INR PPP: 2.9

## 2022-09-13 ENCOUNTER — RX CHANGE (OUTPATIENT)
Age: 52
End: 2022-09-13

## 2022-09-14 ENCOUNTER — RX CHANGE (OUTPATIENT)
Age: 52
End: 2022-09-14

## 2022-09-14 ENCOUNTER — NON-APPOINTMENT (OUTPATIENT)
Age: 52
End: 2022-09-14

## 2022-09-29 ENCOUNTER — APPOINTMENT (OUTPATIENT)
Dept: CARDIOLOGY | Facility: CLINIC | Age: 52
End: 2022-09-29

## 2022-09-29 VITALS
RESPIRATION RATE: 14 BRPM | WEIGHT: 130 LBS | BODY MASS INDEX: 25.52 KG/M2 | SYSTOLIC BLOOD PRESSURE: 102 MMHG | HEART RATE: 70 BPM | DIASTOLIC BLOOD PRESSURE: 66 MMHG | HEIGHT: 60 IN

## 2022-09-29 LAB — INR PPP: 3.6

## 2022-09-29 PROCEDURE — 99214 OFFICE O/P EST MOD 30 MIN: CPT | Mod: 25

## 2022-09-29 PROCEDURE — 93000 ELECTROCARDIOGRAM COMPLETE: CPT

## 2022-10-04 NOTE — HISTORY OF PRESENT ILLNESS
[FreeTextEntry1] : From a cardiac standpoint, Mrs. Dubon denies exertional chest pain or significant shortness of breath.  She does admit to chronic fatigue. \par \par

## 2022-10-04 NOTE — REASON FOR VISIT
[FreeTextEntry1] : Mrs. Dubon is a pleasant 51-year-old  female with a past medical history significant for rheumatic fever/rheumatic heart disease status-post mitral valve repair in 1987 followed by mechanical MVR in 1995, as well as associated dilated cardiomyopathy with reduced left ventricular ejection fraction status-post AICD implantation for primary prevention, as well as a history of chronic atrial fibrillation, and more recently, heart failure, who presents for follow up evaluation.  \par

## 2022-10-04 NOTE — ASSESSMENT
[FreeTextEntry1] : 1.  EKG today reveals biventricular paced rhythm at 70 bpm.  \par \par 2.  Rheumatic heart disease status-post mechanical MVR:  Clinically stable with crisp closure sound on auscultation.  Coumadin today 3.6.  I have asked patient to reduce her dose from 4.5 to 4 mg tonight and then continue with 4.5.  Repeat bloodwork to be performed next week.  \par \par 3.  Fatigue:  Patient with considerable fatigue:  Recent limited bloodwork failed to reveal anemia.  Will obtain additional bloodwork inclusive of hepatic and renal functions, thyroid profile, and vitamin D level.  Patient to follow up with the heart failure team in [place] next week.  If clinically stable, follow up office here six weeks.\par

## 2022-10-05 ENCOUNTER — NON-APPOINTMENT (OUTPATIENT)
Age: 52
End: 2022-10-05

## 2022-10-05 LAB — INR PPP: 2.9

## 2022-10-07 ENCOUNTER — APPOINTMENT (OUTPATIENT)
Dept: HEART FAILURE | Facility: CLINIC | Age: 52
End: 2022-10-07

## 2022-10-07 VITALS
DIASTOLIC BLOOD PRESSURE: 50 MMHG | SYSTOLIC BLOOD PRESSURE: 82 MMHG | BODY MASS INDEX: 25.87 KG/M2 | WEIGHT: 131.8 LBS | HEIGHT: 60 IN | OXYGEN SATURATION: 96 % | HEART RATE: 65 BPM | TEMPERATURE: 98.4 F

## 2022-10-07 PROCEDURE — 99214 OFFICE O/P EST MOD 30 MIN: CPT

## 2022-10-07 RX ORDER — SPIRONOLACTONE 50 MG/1
50 TABLET ORAL DAILY
Qty: 90 | Refills: 1 | Status: DISCONTINUED | COMMUNITY
Start: 2021-10-29 | End: 2022-10-07

## 2022-10-09 NOTE — DISCUSSION/SUMMARY
[FreeTextEntry1] : 52 y/o female with h/o chronic systolic HF ACC/AHA stage C, NICMP/valvular LVEF 13% LVIDd 6cm, rheumatic heart disease s/p mitral valve repair '87 followed by mechanical MVR 1995, s/p ICD '10 upgraded to BI-V ICD '13, RV lead fx s/p extraction+reimplantation c/b LV lead dislodgement+reimplantation '14, CRT-D gen exchanged+recanalization left subclavian vein '19([facility]) and chronic atrial fibrillation who presents for scheduled follow-up. \par She reports MORALES and worsening functional class. She's clinically euvolemic, warm extremities. BP remains borderline low which limits further GDMT optimization. A nuc pharm stress was done and no evidence of ischemia was noted. \par Given her symptoms and inability of uptitrate GDMT, I'm concern about worsening HF. Will refer to cardiac rehab and perform CPET. Interestingly, her nuc stress reported LVEF 43%. Her last echo in January reported LVEF 30%. \par Plan as above.

## 2022-10-09 NOTE — HISTORY OF PRESENT ILLNESS
[FreeTextEntry1] : 52 y/o female with h/o chronic systolic HF ACC/AHA stage C, NICMP/valvular LVEF 13% LVIDd 6cm, rheumatic heart disease s/p mitral valve repair '87 followed by mechanical MVR 1995, s/p ICD '10 upgraded to BI-V ICD '13, RV lead fx s/p extraction+reimplantation c/b LV lead dislodgement+reimplantation '14, CRT-D gen exchanged+recanalization left subclavian vein '19([facility]) and chronic atrial fibrillation who presents for scheduled follow-up.\par \par She states her heart disease started in 1987 when she presented to the hospital with MORALES. She was found to have mitral disease associated to rheumatic heart disease and she underwent MV repair. She did okay for ~8y when she presented symptoms again. She believes at that time she was diagnosed with cardiomyopathy, she underwent re-do sternotomy with mechanical MVR followed by ICD placement. She has undergone multiple generators exchange since then. She believes she may had received an ICD shock 'many years ago' while she was at the [place]. She did not seek medical help at that time.\par \par She lives with her family. She is currently unemployed, stopped working ~2 years ago. Denies tobacco, illicit drug use or etoh use. She denies FH of heart disease. She is originally from  [place]. \par \par Today, she presents for routine follow up. She refers she continues to note worsening functional class. She reports she exercises at home x 25-30 mins 4 times a week. She was able to do a lot more in the past. She denies HF hospitalizations.\par She has not started cardiac rehab. \par

## 2022-10-09 NOTE — CARDIOLOGY SUMMARY
[de-identified] : \par 5/24/22: Paced ventricular rhythm, HR 70bpm\par \par 1/19/22: Paced rhythm, underlying atrial fibrillation [de-identified] : 6/22/22 Nuc pharm stress: large sized, moderate intensity, fixed defect of the entire inferior/inferoseptal wall consistent with infarction, diaphragmatic attenuation artifact and soft tissue attenuation artifact. LVEF 43%, severe hypokinesis basal-mid inferior wall.  [de-identified] : \par 1/19/22 TTE: LVEF ~30%, LVIDd 5.25cm, significant LAE, mildly reduced RV function, mechanical MVR with trace MR (MG 3mmHg), moderate TR, RVSP 25.7mmHg.\par \par \par 5/20/21 TTE LVIDd 6cm LVEF 13% LVOT VTI 23.5cm, mechanical MVR with normal function, mildly reduced RV systolic function, RVSP 22 mmHg, mod-sev TR\par \par 3/6/2018 TTE: LVIDd 5.7cm LVEF 23% , normal functioning mechanical MVR, mildly reduced RV systolic function, RVSP 41 mmHg\par

## 2022-10-09 NOTE — PHYSICAL EXAM
[Normal] : soft, non-tender, no masses/organomegaly, normal bowel sounds [No Edema] : no edema [Alert and Oriented] : alert and oriented

## 2022-10-09 NOTE — ASSESSMENT
[FreeTextEntry1] : # Chronic systolic HF ACC/AHA stage C, NYHA II-III\par NICMP/valvular LVEF now 30% from 18%, LVIDd 5.25cm from 6cm\par Euvolemic on exam, warm extremities\par GDMT: Decrease  Spironolactone to 25mg daily. Continue Toprol-XL 25mg daily, Entresto 24-26mg BID and Farxiga 10mg daily.\par Labs on 10/6/22 showed hyperkalemia. Pending repeat. \par Diuretics: Not indicated at this time\par Device: s/p Bi-V. follows with EP (Fabián Arredondo MD)\par Cardiac rehab referral sent to [facility] - she was never contacted \par CPET in 4-6 wks\par \par # Rheumatic heart disease \par H/o MV repair 1987 followed by mechanical MVR 1995\par AC: Warfarin managed by Primary Cardiologist.\par \par # Chronic atrial fibrillation\par Valvular\par Asymptomatic\par AC as above

## 2022-10-14 ENCOUNTER — APPOINTMENT (OUTPATIENT)
Dept: HEART FAILURE | Facility: CLINIC | Age: 52
End: 2022-10-14

## 2022-10-23 ENCOUNTER — RX RENEWAL (OUTPATIENT)
Age: 52
End: 2022-10-23

## 2022-10-28 ENCOUNTER — NON-APPOINTMENT (OUTPATIENT)
Age: 52
End: 2022-10-28

## 2022-11-10 ENCOUNTER — NON-APPOINTMENT (OUTPATIENT)
Age: 52
End: 2022-11-10

## 2022-11-10 ENCOUNTER — APPOINTMENT (OUTPATIENT)
Dept: CARDIOLOGY | Facility: CLINIC | Age: 52
End: 2022-11-10

## 2022-11-10 VITALS
RESPIRATION RATE: 14 BRPM | HEIGHT: 60 IN | HEART RATE: 70 BPM | WEIGHT: 131 LBS | BODY MASS INDEX: 25.72 KG/M2 | DIASTOLIC BLOOD PRESSURE: 66 MMHG | SYSTOLIC BLOOD PRESSURE: 92 MMHG

## 2022-11-10 LAB — INR PPP: 2.7

## 2022-11-10 PROCEDURE — 93000 ELECTROCARDIOGRAM COMPLETE: CPT

## 2022-11-10 PROCEDURE — 99214 OFFICE O/P EST MOD 30 MIN: CPT | Mod: 25

## 2022-11-10 NOTE — DISCUSSION/SUMMARY
[FreeTextEntry1] : 1 - Rheumatic heart disease status-post mechanical MVR:  Clinically stable.  Today's INR is 2.7.  Patient will continue with coumadin at 4.5mg daily and will recheck in one month.\par \par 2 - Recent lightheadedness/headache:   had a couple of days of lightheadedness and headache a few weeks ago.  States that her blood pressure was low.  She tends to run high 80s-100/50-60s.  Has not had any further complaints of lightheadedness or headaches.  Will continue with current medications, advised to hydrate well and will monitor blood pressure at home.\par \par 3 - Mrs. Dubon was recently seen by Dr. Crockett (heart failure team) and will be undergoing a CPET on 11/18/2022 with a follow up visit with Dr. Crockett on 11/25/2022.\par \par 4 - Labs prior to follow up visit/\par \par 5 - Follow up with Dr. Landers in 6-8 weeks. [EKG obtained to assist in diagnosis and management of assessed problem(s)] : EKG obtained to assist in diagnosis and management of assessed problem(s)

## 2022-11-10 NOTE — PHYSICAL EXAM
[Well Developed] : well developed [Well Nourished] : well nourished [No Acute Distress] : no acute distress [Normal Conjunctiva] : normal conjunctiva [Normal Venous Pressure] : normal venous pressure [No Carotid Bruit] : no carotid bruit [Normal S1, S2] : normal S1, S2 [No Rub] : no rub [No Gallop] : no gallop [Clear Lung Fields] : clear lung fields [No Respiratory Distress] : no respiratory distress  [Soft] : abdomen soft [Normal Bowel Sounds] : normal bowel sounds [Normal Gait] : normal gait [No Edema] : no edema [No Rash] : no rash [No Skin Lesions] : no skin lesions [Moves all extremities] : moves all extremities [No Focal Deficits] : no focal deficits [Normal Speech] : normal speech [Alert and Oriented] : alert and oriented [Normal memory] : normal memory [de-identified] : I-II/VI systolic murmur

## 2022-11-10 NOTE — HISTORY OF PRESENT ILLNESS
[FreeTextEntry1] : Mrs. Dubon presents today without complaints of exertional chest pain, shortness of breath, palpitations or syncope.  She did have a couple of days of lightheadedness and headache a few weeks ago.  States that her blood pressure was low.  She tends to run high 80s-100/50-60s.  Has not had any further complaints of lightheadedness or headaches.  Was recently seen by Dr. Crockett.

## 2022-11-18 ENCOUNTER — APPOINTMENT (OUTPATIENT)
Dept: HEART FAILURE | Facility: CLINIC | Age: 52
End: 2022-11-18

## 2022-11-18 PROCEDURE — 93000 ELECTROCARDIOGRAM COMPLETE: CPT | Mod: 59

## 2022-11-18 PROCEDURE — 94200 LUNG FUNCTION TEST (MBC/MVV): CPT | Mod: 59

## 2022-11-18 PROCEDURE — 93018 CV STRESS TEST I&R ONLY: CPT | Mod: 59

## 2022-11-18 PROCEDURE — 94375 RESPIRATORY FLOW VOLUME LOOP: CPT

## 2022-11-18 PROCEDURE — 94621 CARDIOPULM EXERCISE TESTING: CPT

## 2022-11-18 RX ORDER — KIT FOR THE PREPARATION OF TECHNETIUM TC99M SESTAMIBI 1 MG/5ML
INJECTION, POWDER, LYOPHILIZED, FOR SOLUTION PARENTERAL
Refills: 0 | Status: COMPLETED | OUTPATIENT
Start: 2022-11-18

## 2022-11-18 RX ADMIN — KIT FOR THE PREPARATION OF TECHNETIUM TC99M SESTAMIBI 0: 1 INJECTION, POWDER, LYOPHILIZED, FOR SOLUTION PARENTERAL at 00:00

## 2022-11-25 ENCOUNTER — APPOINTMENT (OUTPATIENT)
Dept: HEART FAILURE | Facility: CLINIC | Age: 52
End: 2022-11-25

## 2022-11-25 VITALS
SYSTOLIC BLOOD PRESSURE: 88 MMHG | BODY MASS INDEX: 26.16 KG/M2 | DIASTOLIC BLOOD PRESSURE: 52 MMHG | HEART RATE: 70 BPM | TEMPERATURE: 98.2 F | HEIGHT: 60 IN | OXYGEN SATURATION: 95 % | WEIGHT: 133.25 LBS

## 2022-11-25 PROCEDURE — 99214 OFFICE O/P EST MOD 30 MIN: CPT

## 2022-11-27 NOTE — ASSESSMENT
[FreeTextEntry1] : # Chronic systolic HF ACC/AHA stage C, NYHA II-III\par NICMP/valvular LVEF now 30% from 18%, LVIDd 5.25cm from 6cm\par Euvolemic on exam, warm extremities\par GDMT: Spironolactone to 25mg daily, Toprol-XL 25mg daily, Entresto 24-26mg BID and Farxiga 10mg daily. I have recommended her to space out meds especially ARNI/BB. \par Diuretics: Not indicated at this time\par Device: s/p Bi-V. follows with EP (Fabián Arredondo MD). Blunted HR response on CPET. Will d/w Dr. Arredondo\par Pt states she exercises at home.\par CPET on 11/18 was suboptimal due to poor quality and RER 1.06. AT could not be identified. Independent markers including Ve/VCO2 slope and OUES were abnormal. She demonstrated blunted HR response and spirometry was inconclusive. It seems pt was not engaged with the testing process.  We have discussed the above results. \par The plan is to contact EP to discuss device rate response, repeat TTE and consider C. rehab. If LVEF on TTE is < 35% will proceed with RHC to further risk stratify. \par Will obtain baseline labs. \par I'm concern about her decline in functional class, abnormal CPET and low BP while on GDMT. Will follow closely. \par I will ask our team to call her in a week to review BP diary. \par \par # Rheumatic heart disease \par H/o MV repair 1987 followed by mechanical MVR 1995\par AC: Warfarin managed by Primary Cardiologist.\par \par # Chronic atrial fibrillation\par Valvular\par Asymptomatic\par AC as above

## 2022-11-27 NOTE — HISTORY OF PRESENT ILLNESS
[FreeTextEntry1] : 52 y/o female with h/o chronic systolic HF ACC/AHA stage C, NICMP/valvular LVEF 13% LVIDd 6cm, rheumatic heart disease s/p mitral valve repair '87 followed by mechanical MVR 1995, s/p ICD '10 upgraded to BI-V ICD '13, RV lead fx s/p extraction+reimplantation c/b LV lead dislodgement+reimplantation '14, CRT-D gen exchanged+recanalization left subclavian vein '19([facility]) and chronic atrial fibrillation who presents for scheduled follow-up.\par \par She states her heart disease started in 1987 when she presented to the hospital with MORALES. She was found to have mitral disease associated to rheumatic heart disease and she underwent MV repair. She did okay for ~8y when she presented symptoms again. She believes at that time she was diagnosed with cardiomyopathy, she underwent re-do sternotomy with mechanical MVR followed by ICD placement. She has undergone multiple generators exchange since then. She believes she may had received an ICD shock 'many years ago' while she was at the [place]. She did not seek medical help at that time.\par \par She lives with her family. She is currently unemployed, stopped working ~2 years ago. Denies tobacco, illicit drug use or etoh use. She denies FH of heart disease. [place] is originally from  [place]. \par \par Today, she presents for routine follow up. Her main complaint is worsening functional class. She exercises at home x 25-30 mins at least 3 times a week. She was able to do a lot more in the past. \par She also refers one episode of lightheadedness ~1mo ago. \par She underwent CPET on 11/18 which was stopped early. Pt was not very cooperative during the test. \par She denies HF hospitalizations.\par She has not started cardiac rehab. \par She reports SBP at home ranges between 90-100s. Sporadically sBP is < 90.

## 2022-11-27 NOTE — DISCUSSION/SUMMARY
[FreeTextEntry1] : 50 y/o female with h/o chronic systolic HF ACC/AHA stage C, NICMP/valvular LVEF 13% LVIDd 6cm, rheumatic heart disease s/p mitral valve repair '87 followed by mechanical MVR 1995, s/p ICD '10 upgraded to BI-V ICD '13, RV lead fx s/p extraction+reimplantation c/b LV lead dislodgement+reimplantation '14, CRT-D gen exchanged+recanalization left subclavian vein '19([facility]) and chronic atrial fibrillation who presents for scheduled follow-up. \par She reports MORALES and worsening functional class. She's clinically euvolemic, warm extremities. BP remains borderline low which limits further GDMT optimization. A nuc pharm stress was done and no evidence of ischemia was noted. Interestingly, her nuc stress reported LVEF 43%. Her last echo in January reported LVEF 30%. \par She recently underwent a CPET which was inconclusive. She did not achieved AT, RER was 1.06. However, independent markers were abnormal. She was also noted to have a blunted HR response. We decided to d/w EP her device settings and repeat TTE to determine need for RHC. She's definitely approaching the point where we should consider advanced therapies. Will follow closely.

## 2022-11-27 NOTE — PHYSICAL EXAM
[Normal] : soft, non-tender, no masses/organomegaly, normal bowel sounds [No Edema] : no edema [No Rash] : no rash [Alert and Oriented] : alert and oriented [de-identified] : Repeat BP 90/65

## 2022-11-27 NOTE — CARDIOLOGY SUMMARY
[de-identified] : \par 5/24/22: Paced ventricular rhythm, HR 70bpm\par \par 1/19/22: Paced rhythm, underlying atrial fibrillation [de-identified] : 11/18/22 CPET: Ex time 4:58 mins RER 1.06 Blunted HR response (Peak 86 bpm -> 50% MPHR) Rest /56 PEak /70 Vo2 9.2 ml/kg/min (37% predicted) 2.8 METs achieved Ve/VCO2 49 PETCO baseline 29 (abnormal). Hyperventilatory breathing pattern at baseline noted. OUES 0.69 l/min. Quality of study was poor. Spirometry inconclusive. \par \par 6/22/22 Nuc pharm stress: large sized, moderate intensity, fixed defect of the entire inferior/inferoseptal wall consistent with infarction, diaphragmatic attenuation artifact and soft tissue attenuation artifact. LVEF 43%, severe hypokinesis basal-mid inferior wall.  [de-identified] : \par 1/19/22 TTE: LVEF ~30%, LVIDd 5.25cm, significant LAE, mildly reduced RV function, mechanical MVR with trace MR (MG 3mmHg), moderate TR, RVSP 25.7mmHg.\par \par \par 5/20/21 TTE LVIDd 6cm LVEF 13% LVOT VTI 23.5cm, mechanical MVR with normal function, mildly reduced RV systolic function, RVSP 22 mmHg, mod-sev TR\par \par 3/6/2018 TTE: LVIDd 5.7cm LVEF 23% , normal functioning mechanical MVR, mildly reduced RV systolic function, RVSP 41 mmHg\par

## 2022-12-07 LAB — INR PPP: 3.9

## 2022-12-09 ENCOUNTER — APPOINTMENT (OUTPATIENT)
Dept: CARDIOLOGY | Facility: CLINIC | Age: 52
End: 2022-12-09

## 2022-12-09 PROCEDURE — 93306 TTE W/DOPPLER COMPLETE: CPT

## 2022-12-12 ENCOUNTER — NON-APPOINTMENT (OUTPATIENT)
Age: 52
End: 2022-12-12

## 2023-01-24 ENCOUNTER — OFFICE (OUTPATIENT)
Dept: URBAN - METROPOLITAN AREA CLINIC 12 | Facility: CLINIC | Age: 53
Setting detail: OPHTHALMOLOGY
End: 2023-01-24
Payer: COMMERCIAL

## 2023-01-24 DIAGNOSIS — H40.013: ICD-10-CM

## 2023-01-24 DIAGNOSIS — H16.223: ICD-10-CM

## 2023-01-24 PROCEDURE — 92014 COMPRE OPH EXAM EST PT 1/>: CPT | Performed by: OPHTHALMOLOGY

## 2023-01-24 PROCEDURE — 92250 FUNDUS PHOTOGRAPHY W/I&R: CPT | Performed by: OPHTHALMOLOGY

## 2023-01-24 ASSESSMENT — REFRACTION_MANIFEST
OD_AXIS: 090
OD_ADD: +1.75
OS_AXIS: 105
OD_SPHERE: PLANO
OS_SPHERE: -0.75
OS_CYLINDER: +0.50
OU_VA: 20/20
OD_VA1: 20/25+3
OD_CYLINDER: +0.50
OS_ADD: +1.75
OS_VA1: 20/20-2

## 2023-01-24 ASSESSMENT — REFRACTION_AUTOREFRACTION
OS_SPHERE: +1.00
OD_CYLINDER: -0.75
OD_SPHERE: +0.75
OD_AXIS: 168
OS_AXIS: 007
OS_CYLINDER: -1.00

## 2023-01-24 ASSESSMENT — REFRACTION_CURRENTRX
OS_VPRISM_DIRECTION: SV
OS_OVR_VA: 20/
OD_VPRISM_DIRECTION: SV
OD_ADD: +1.50
OD_OVR_VA: 20/
OS_ADD: +1.50

## 2023-01-24 ASSESSMENT — TONOMETRY
OS_IOP_MMHG: 18
OD_IOP_MMHG: 17

## 2023-01-24 ASSESSMENT — SPHEQUIV_DERIVED
OD_SPHEQUIV: 0.375
OS_SPHEQUIV: -0.5
OS_SPHEQUIV: 0.5

## 2023-01-24 ASSESSMENT — AXIALLENGTH_DERIVED
OS_AL: 22.6553
OD_AL: 22.5735
OS_AL: 23.0204

## 2023-01-24 ASSESSMENT — VISUAL ACUITY
OD_BCVA: 20/30
OS_BCVA: 20/20-2

## 2023-01-24 ASSESSMENT — DECREASING TEAR LAKE - SEVERITY SCORE
OD_DEC_TEARLAKE: T 1+
OS_DEC_TEARLAKE: T 1+

## 2023-01-24 ASSESSMENT — CONFRONTATIONAL VISUAL FIELD TEST (CVF)
OD_FINDINGS: FULL
OS_FINDINGS: FULL

## 2023-01-24 ASSESSMENT — KERATOMETRY
OS_K2POWER_DIOPTERS: 46.50
OD_AXISANGLE_DEGREES: 075
OD_K1POWER_DIOPTERS: 45.25
OS_AXISANGLE_DEGREES: 099
OS_K1POWER_DIOPTERS: 44.75
OD_K2POWER_DIOPTERS: 46.75

## 2023-01-24 ASSESSMENT — SUPERFICIAL PUNCTATE KERATITIS (SPK)
OD_SPK: 1+
OS_SPK: 1+

## 2023-01-25 ENCOUNTER — APPOINTMENT (OUTPATIENT)
Dept: HEART FAILURE | Facility: CLINIC | Age: 53
End: 2023-01-25
Payer: MEDICAID

## 2023-01-25 VITALS
TEMPERATURE: 98.3 F | WEIGHT: 133 LBS | HEART RATE: 70 BPM | BODY MASS INDEX: 26.11 KG/M2 | HEIGHT: 60 IN | OXYGEN SATURATION: 96 % | DIASTOLIC BLOOD PRESSURE: 64 MMHG | SYSTOLIC BLOOD PRESSURE: 108 MMHG

## 2023-01-25 PROCEDURE — 99214 OFFICE O/P EST MOD 30 MIN: CPT

## 2023-01-25 NOTE — CARDIOLOGY SUMMARY
[de-identified] : \par 5/24/22: Paced ventricular rhythm, HR 70bpm\par \par 1/19/22: Paced rhythm, underlying atrial fibrillation [de-identified] : \par 11/18/22 CPET: Ex time 4:58 mins RER 1.06 Blunted HR response (Peak 86 bpm -> 50% MPHR) Rest /56 PEak /70 Vo2 9.2 ml/kg/min (37% predicted) 2.8 METs achieved Ve/VCO2 49 PETCO baseline 29 (abnormal). Hyperventilatory breathing pattern at baseline noted. OUES 0.69 l/min. Quality of study was poor. Spirometry inconclusive. \par \par 6/22/22 Nuc pharm stress: large sized, moderate intensity, fixed defect of the entire inferior/inferoseptal wall consistent with infarction, diaphragmatic attenuation artifact and soft tissue attenuation artifact. LVEF 43%, severe hypokinesis basal-mid inferior wall.  [de-identified] : \par 12/9/22 TTE: LVEF 30%, LVIDd 5.85cm, GLS -11.1%, grade II DD, severe LAE, moderate RVE with moderatly reduced RV systolic function, mechanical valve in mitral position (MG 2mmHg, HR 70bpm), moderate TR, PASP 28.2mmHg (RAP 3mmHg).\par \par 1/19/22 TTE: LVEF ~30%, LVIDd 5.25cm, significant LAE, mildly reduced RV function, mechanical MVR with trace MR (MG 3mmHg), moderate TR, RVSP 25.7mmHg.\par \par \par 5/20/21 TTE LVIDd 6cm LVEF 13% LVOT VTI 23.5cm, mechanical MVR with normal function, mildly reduced RV systolic function, RVSP 22 mmHg, mod-sev TR\par \par 3/6/2018 TTE: LVIDd 5.7cm LVEF 23% , normal functioning mechanical MVR, mildly reduced RV systolic function, RVSP 41 mmHg\par

## 2023-01-25 NOTE — REASON FOR VISIT
[Cardiac Failure] : cardiac failure [FreeTextEntry3] : Long Landers MD [FreeTextEntry1] : \par Cards: Dr. Landers\par EP: Dr. Arredondo

## 2023-01-25 NOTE — ASSESSMENT
[FreeTextEntry1] : # Chronic systolic HF ACC/AHA stage C, NYHA II-III\par -NICMP/valvular LVEF now 30% from 18%, LVIDd 5.85\par -Euvolemic on exam, warm extremities\par -GDMT: Continue Toprol-XL 25mg daily, Entresto 24-26mg BID, Spironolactone 25mg daily and Farxiga 10mg daily.\par -Diuretics: Not indicated at this time\par -Device: s/p Bi-V. follows with EP (Fabián Arredondo MD). Blunted HR response on CPET. Will d/w Dr. Arredondo.\par -Pt states she exercises at home. Unable to partake in cardiac rehab due to transportation issues.\par -CPET on 11/18 was suboptimal due to poor quality and RER 1.06. AT could not be identified. Independent markers including Ve/VCO2 slope and OUES were abnormal. She demonstrated blunted HR response and spirometry was inconclusive. It seems pt was not engaged with the testing process.  We have discussed the above results. \par -She has some concerning features including decline in functional status and low BP however this appears better today and she denies any further dizziness. \par -Will obtain baseline labs (prescription provided). \par -Should f/u with EP to review device settings.\par -Low threshold to schedule RHC should patient refer worsening symptoms. \par \par # Rheumatic heart disease \par -H/o MV repair 1987 followed by mechanical MVR 1995\par -AC: Warfarin managed by Primary Cardiologist.\par \par # Chronic atrial fibrillation\par -Valvular\par -Asymptomatic\par -AC as above

## 2023-01-25 NOTE — HISTORY OF PRESENT ILLNESS
[FreeTextEntry1] : 53 y/o female with h/o chronic systolic HF ACC/AHA stage C, NICMP/valvular LVEF 13% LVIDd 6cm, rheumatic heart disease s/p mitral valve repair '87 followed by mechanical MVR 1995, s/p ICD '10 upgraded to BI-V ICD '13, RV lead fx s/p extraction+reimplantation c/b LV lead dislodgement+reimplantation '14, CRT-D gen exchanged+recanalization left subclavian vein '19([facility]) and chronic atrial fibrillation who presents for scheduled follow-up.\par \par She states her heart disease started in 1987 when she presented to the hospital with MORALES. She was found to have mitral disease associated to rheumatic heart disease and she underwent MV repair. She did okay for ~8y when she presented symptoms again. She believes at that time she was diagnosed with cardiomyopathy, she underwent re-do sternotomy with mechanical MVR followed by ICD placement. She has undergone multiple generators exchange since then. She believes she may had received an ICD shock 'many years ago' while she was at the [place]. She did not seek medical help at that time.\par \par She lives with her family. She is currently unemployed, stopped working ~2 years ago. Denies tobacco, illicit drug use or etoh use. She denies FH of heart disease. [place] is originally from  [place]. \par \par Since her last visit, she had a repeat TTE on 12/2/22 which shows no significant change (LVEF 30%, moderate RV dysfunction, moderate TR).\par \par She reports feeling well today and denies overt HF symptoms. She continues to exercise at home for 25-30 min 3 days/week however she refers that she was able to complete more exercise previously. Her main reason for stopping is generalized fatigue. She specifically denies CP, palpitations, SOB, dizziness/LH, edema, orthopnea and PND. She explains that she is not able to participate in cardiac rehab because she does not have a car and cannot pay for transportation 3 days/week.

## 2023-01-25 NOTE — DISCUSSION/SUMMARY
[Patient] : the patient [___ Month(s)] : in [unfilled] month(s) [With ___] : with [unfilled] [FreeTextEntry1] : 53 y/o female with h/o chronic systolic HF ACC/AHA stage C, NICMP/valvular LVEF 13% LVIDd 6cm, rheumatic heart disease s/p mitral valve repair '87 followed by mechanical MVR 1995, s/p ICD '10 upgraded to BI-V ICD '13, RV lead fx s/p extraction+reimplantation c/b LV lead dislodgement+reimplantation '14, CRT-D gen exchanged+recanalization left subclavian vein '19([facility]) and chronic atrial fibrillation who presents for scheduled follow-up.\par She denies overt HF symptoms but has noticed overall decline in activity tolerance. Clinically she is euvolemic w/ warm extremities. BP improved on today's visit. Patient did not bring home log for our review. She denies recurrent dizziness/LH. \par Repeat TTE 12/9/22 was essentially unchanged.  \par She recently underwent a CPET which was inconclusive. She did not achieve AT, RER was 1.06. \par I have recommended cardiac rehab but unfortunately the patient does not have means of transportation.\par For now, she will continue medications as prescribed and should f/u with EP. \par Should her symptoms change will have low threshold for RHC. \par Will alternate visits with Dr. Landers.

## 2023-01-25 NOTE — PHYSICAL EXAM
[No Acute Distress] : no acute distress [Normal Conjunctiva] : normal conjunctiva [Normal Venous Pressure] : normal venous pressure [Normal S1, S2] : normal S1, S2 [No Rub] : no rub [No Gallop] : no gallop [Clear Lung Fields] : clear lung fields [Good Air Entry] : good air entry [No Respiratory Distress] : no respiratory distress  [Soft] : abdomen soft [No Edema] : no edema [No Rash] : no rash [Moves all extremities] : moves all extremities [Alert and Oriented] : alert and oriented [de-identified] : EOMs intact [de-identified] : mechanical click

## 2023-02-10 ENCOUNTER — OFFICE (OUTPATIENT)
Dept: URBAN - METROPOLITAN AREA CLINIC 12 | Facility: CLINIC | Age: 53
Setting detail: OPHTHALMOLOGY
End: 2023-02-10
Payer: COMMERCIAL

## 2023-02-10 DIAGNOSIS — H16.223: ICD-10-CM

## 2023-02-10 DIAGNOSIS — H40.013: ICD-10-CM

## 2023-02-10 PROCEDURE — 92133 CPTRZD OPH DX IMG PST SGM ON: CPT | Performed by: OPHTHALMOLOGY

## 2023-02-10 PROCEDURE — 92083 EXTENDED VISUAL FIELD XM: CPT | Performed by: OPHTHALMOLOGY

## 2023-02-10 PROCEDURE — 99213 OFFICE O/P EST LOW 20 MIN: CPT | Performed by: OPHTHALMOLOGY

## 2023-02-10 ASSESSMENT — KERATOMETRY
OD_AXISANGLE_DEGREES: 082
OS_K2POWER_DIOPTERS: 46.25
OD_K2POWER_DIOPTERS: 46.75
OD_K1POWER_DIOPTERS: 45.25
OS_AXISANGLE_DEGREES: 103
OS_K1POWER_DIOPTERS: 44.75

## 2023-02-10 ASSESSMENT — PACHYMETRY
OS_CT_UM: 477
OD_CT_UM: 472
OD_CT_CORRECTION: 5
OS_CT_CORRECTION: 5

## 2023-02-10 ASSESSMENT — REFRACTION_CURRENTRX
OD_OVR_VA: 20/
OD_ADD: +1.50
OS_OVR_VA: 20/
OS_ADD: +1.50
OS_VPRISM_DIRECTION: SV
OD_VPRISM_DIRECTION: SV

## 2023-02-10 ASSESSMENT — REFRACTION_AUTOREFRACTION
OD_AXIS: 171
OS_SPHERE: +1.00
OS_AXIS: 010
OD_CYLINDER: -0.75
OD_SPHERE: +0.75
OS_CYLINDER: -1.00

## 2023-02-10 ASSESSMENT — REFRACTION_MANIFEST
OS_CYLINDER: +0.50
OD_CYLINDER: +0.50
OD_ADD: +1.75
OU_VA: 20/20
OS_AXIS: 105
OS_VA1: 20/20-2
OD_VA1: 20/25+3
OS_ADD: +1.75
OD_AXIS: 090
OS_SPHERE: -0.75
OD_SPHERE: PLANO

## 2023-02-10 ASSESSMENT — CONFRONTATIONAL VISUAL FIELD TEST (CVF)
OS_FINDINGS: FULL
OD_FINDINGS: FULL

## 2023-02-10 ASSESSMENT — DECREASING TEAR LAKE - SEVERITY SCORE
OD_DEC_TEARLAKE: T 1+
OS_DEC_TEARLAKE: T 1+

## 2023-02-10 ASSESSMENT — TONOMETRY
OS_IOP_MMHG: 16
OD_IOP_MMHG: 14

## 2023-02-10 ASSESSMENT — AXIALLENGTH_DERIVED
OD_AL: 22.5735
OS_AL: 22.6977
OS_AL: 23.0642

## 2023-02-10 ASSESSMENT — SUPERFICIAL PUNCTATE KERATITIS (SPK)
OS_SPK: 1+
OD_SPK: 1+

## 2023-02-10 ASSESSMENT — SPHEQUIV_DERIVED
OS_SPHEQUIV: 0.5
OS_SPHEQUIV: -0.5
OD_SPHEQUIV: 0.375

## 2023-02-10 ASSESSMENT — VISUAL ACUITY
OS_BCVA: 20/30+3
OD_BCVA: 20/25-2

## 2023-03-06 ENCOUNTER — OFFICE (OUTPATIENT)
Dept: URBAN - METROPOLITAN AREA CLINIC 100 | Facility: CLINIC | Age: 53
Setting detail: OPHTHALMOLOGY
End: 2023-03-06
Payer: COMMERCIAL

## 2023-03-06 DIAGNOSIS — H16.223: ICD-10-CM

## 2023-03-06 DIAGNOSIS — H40.013: ICD-10-CM

## 2023-03-06 PROCEDURE — 92083 EXTENDED VISUAL FIELD XM: CPT | Performed by: OPHTHALMOLOGY

## 2023-03-06 PROCEDURE — 92250 FUNDUS PHOTOGRAPHY W/I&R: CPT | Performed by: OPHTHALMOLOGY

## 2023-03-06 PROCEDURE — 99213 OFFICE O/P EST LOW 20 MIN: CPT | Performed by: OPHTHALMOLOGY

## 2023-03-06 ASSESSMENT — SUPERFICIAL PUNCTATE KERATITIS (SPK)
OD_SPK: T
OS_SPK: T

## 2023-03-06 ASSESSMENT — CONFRONTATIONAL VISUAL FIELD TEST (CVF)
OS_FINDINGS: FULL
OD_FINDINGS: FULL

## 2023-03-06 ASSESSMENT — KERATOMETRY
OS_K2POWER_DIOPTERS: 46.75
OD_K1POWER_DIOPTERS: 45.50
OD_AXISANGLE_DEGREES: 167
OS_AXISANGLE_DEGREES: 006
OS_K1POWER_DIOPTERS: 45.00
OD_K2POWER_DIOPTERS: 47.00

## 2023-03-06 ASSESSMENT — REFRACTION_AUTOREFRACTION
OS_CYLINDER: -1.50
OS_SPHERE: +1.00
OD_AXIS: 169
OS_AXIS: 006
OD_SPHERE: +0.75
OD_CYLINDER: -0.75

## 2023-03-06 ASSESSMENT — PACHYMETRY
OS_CT_CORRECTION: 5
OS_CT_UM: 477
OD_CT_CORRECTION: 5
OD_CT_UM: 472

## 2023-03-06 ASSESSMENT — SPHEQUIV_DERIVED
OS_SPHEQUIV: 0.125
OD_SPHEQUIV: 0.125
OS_SPHEQUIV: 0.25
OD_SPHEQUIV: 0.375

## 2023-03-06 ASSESSMENT — REFRACTION_MANIFEST
OS_AXIS: 005
OU_VA: 20/20-
OS_ADD: +1.75
OD_SPHERE: +0.50
OS_VA1: 20/25
OD_VA1: 20/20
OD_ADD: +1.75
OD_AXIS: 170
OD_CYLINDER: -0.75
OS_CYLINDER: -1.25
OS_SPHERE: +0.75

## 2023-03-06 ASSESSMENT — AXIALLENGTH_DERIVED
OS_AL: 22.6604
OD_AL: 22.5786
OS_AL: 22.7055
OD_AL: 22.4897

## 2023-03-06 ASSESSMENT — REFRACTION_CURRENTRX
OD_ADD: +1.50
OS_ADD: +1.50
OD_OVR_VA: 20/
OS_VPRISM_DIRECTION: SV
OS_OVR_VA: 20/
OD_VPRISM_DIRECTION: SV

## 2023-03-06 ASSESSMENT — TONOMETRY
OS_IOP_MMHG: 18
OD_IOP_MMHG: 18

## 2023-03-06 ASSESSMENT — VISUAL ACUITY
OD_BCVA: 20/25-2
OS_BCVA: 20/25

## 2023-03-08 ENCOUNTER — APPOINTMENT (OUTPATIENT)
Dept: CARDIOLOGY | Facility: CLINIC | Age: 53
End: 2023-03-08
Payer: MEDICAID

## 2023-03-08 VITALS
RESPIRATION RATE: 14 BRPM | WEIGHT: 132 LBS | SYSTOLIC BLOOD PRESSURE: 100 MMHG | HEART RATE: 72 BPM | HEIGHT: 61 IN | BODY MASS INDEX: 24.92 KG/M2 | DIASTOLIC BLOOD PRESSURE: 60 MMHG

## 2023-03-08 DIAGNOSIS — F41.9 ANXIETY DISORDER, UNSPECIFIED: ICD-10-CM

## 2023-03-08 LAB — INR PPP: 4.3

## 2023-03-08 PROCEDURE — 99214 OFFICE O/P EST MOD 30 MIN: CPT | Mod: 25

## 2023-03-08 PROCEDURE — 93000 ELECTROCARDIOGRAM COMPLETE: CPT

## 2023-03-08 RX ORDER — ALPRAZOLAM 0.25 MG/1
0.25 TABLET ORAL
Qty: 20 | Refills: 0 | Status: ACTIVE | COMMUNITY
Start: 2023-03-08 | End: 1900-01-01

## 2023-03-10 NOTE — REASON FOR VISIT
[FreeTextEntry1] : Mrs. Dubon is a pleasant 52-year-old  female with a past medical history significant for rheumatic fever/rheumatic heart disease status-post mitral valve repair in 1987 followed by mechanical MVR in 1995, as well as associated dilated cardiomyopathy with reduced left ventricular ejection fraction status-post AICD implantation for primary prevention, and more recently, the development of chronic atrial fibrillation and systolic heart failure, who presents for follow up evaluation.  \par

## 2023-03-10 NOTE — HISTORY OF PRESENT ILLNESS
[FreeTextEntry1] : From a cardiac standpoint, Mrs. Dubon appears stable at this time denying significant exertional chest pain or shortness of breath.  She states she exercises at home on a regular basis. \par

## 2023-03-10 NOTE — PHYSICAL EXAM
[Well Developed] : well developed [Well Nourished] : well nourished [No Acute Distress] : no acute distress [Normal Conjunctiva] : normal conjunctiva [Normal Venous Pressure] : normal venous pressure [No Carotid Bruit] : no carotid bruit [Normal S1, S2] : normal S1, S2 [No Rub] : no rub [No Gallop] : no gallop [Clear Lung Fields] : clear lung fields [No Respiratory Distress] : no respiratory distress  [Soft] : abdomen soft [Normal Bowel Sounds] : normal bowel sounds [Normal Gait] : normal gait [No Edema] : no edema [No Rash] : no rash [No Skin Lesions] : no skin lesions [Moves all extremities] : moves all extremities [No Focal Deficits] : no focal deficits [Normal Speech] : normal speech [Alert and Oriented] : alert and oriented [Normal memory] : normal memory [de-identified] : I-II/VI systolic murmur

## 2023-03-10 NOTE — ASSESSMENT
[FreeTextEntry1] : \par 1. EKG today reveals ventricular paced rhythm with occasional supraventricular complexes and occasional PVC.  \par \par 2. Non-ischemic/valvular cardiomyopathy with marked left ventricular dysfunction:  Clinically stable on current medications.  Most recent echocardiography reveals mildly dilated left ventricular chamber with ejection fraction estimated at approximately 30%. This appears to be an improvement when compared to prior studies here in this office.  The [employer] mechanical prosthesis in the mitral position appears to be working well without evidence of stenosis or regurgitation.  A recent cardiopulmonary exercise stress test was felt to be suboptimal due to the patient’s blunted heart rate response to exercise.  Patient advised to continue with current medications as well as anticoagulation with Warfarin.  Good hydration and regular aerobic exercise discussed as well.  If clinically stable, follow up office visit here three months. \par

## 2023-04-04 LAB — INR PPP: 3.2

## 2023-04-06 ENCOUNTER — RX CHANGE (OUTPATIENT)
Age: 53
End: 2023-04-06

## 2023-04-25 ENCOUNTER — APPOINTMENT (OUTPATIENT)
Dept: HEART FAILURE | Facility: CLINIC | Age: 53
End: 2023-04-25
Payer: MEDICAID

## 2023-04-25 VITALS
OXYGEN SATURATION: 95 % | WEIGHT: 130 LBS | HEIGHT: 61 IN | BODY MASS INDEX: 24.55 KG/M2 | SYSTOLIC BLOOD PRESSURE: 102 MMHG | HEART RATE: 76 BPM | DIASTOLIC BLOOD PRESSURE: 66 MMHG | TEMPERATURE: 98.6 F

## 2023-04-25 PROCEDURE — 99214 OFFICE O/P EST MOD 30 MIN: CPT

## 2023-05-05 LAB — INR PPP: 3.3

## 2023-05-22 ENCOUNTER — RX RENEWAL (OUTPATIENT)
Age: 53
End: 2023-05-22

## 2023-05-23 NOTE — CARDIOLOGY SUMMARY
[de-identified] : \par 5/24/22: Paced ventricular rhythm, HR 70bpm\par \par 1/19/22: Paced rhythm, underlying atrial fibrillation\par  [de-identified] : \par 11/18/22 CPET: Ex time 4:58 mins RER 1.06 Blunted HR response (Peak 86 bpm -> 50% MPHR) Rest /56 PEak /70 Vo2 9.2 ml/kg/min (37% predicted) 2.8 METs achieved Ve/VCO2 49 PETCO baseline 29 (abnormal). Hyperventilatory breathing pattern at baseline noted. OUES 0.69 l/min. Quality of study was poor. Spirometry inconclusive. \par \par 6/22/22 Nuc pharm stress: large sized, moderate intensity, fixed defect of the entire inferior/inferoseptal wall consistent with infarction, diaphragmatic attenuation artifact and soft tissue attenuation artifact. LVEF 43%, severe hypokinesis basal-mid inferior wall. \par  [de-identified] : \par 12/9/22 TTE: LVEF 30%, LVIDd 5.85cm, GLS -11.1%, grade II DD, severe LAE, moderate RVE with moderatly reduced RV systolic function, mechanical valve in mitral position (MG 2mmHg, HR 70bpm), moderate TR, PASP 28.2mmHg (RAP 3mmHg).\par \par 1/19/22 TTE: LVEF ~30%, LVIDd 5.25cm, significant LAE, mildly reduced RV function, mechanical MVR with trace MR (MG 3mmHg), moderate TR, RVSP 25.7mmHg.\par \par 5/20/21 TTE LVIDd 6cm LVEF 13% LVOT VTI 23.5cm, mechanical MVR with normal function, mildly reduced RV systolic function, RVSP 22 mmHg, mod-sev TR\par \par 3/6/2018 TTE: LVIDd 5.7cm LVEF 23% , normal functioning mechanical MVR, mildly reduced RV systolic function, RVSP 41 mmHg\par

## 2023-05-23 NOTE — END OF VISIT
[Time Spent: ___ minutes] : I have spent [unfilled] minutes of time on the encounter. [FreeTextEntry3] :  I, Debbie Crockett MD independently performed a history and physical examination of the patient, and formulated the management plan.\par I have reviewed the note by POPEYE Aguirre and agree with the documented findings and plan of care.\par

## 2023-05-23 NOTE — HISTORY OF PRESENT ILLNESS
[FreeTextEntry1] : 51 y/o female with h/o chronic systolic HF ACC/AHA stage C, NICMP/valvular LVEF 13% LVIDd 6cm, rheumatic heart disease s/p mitral valve repair '87 followed by mechanical MVR 1995, s/p ICD '10 upgraded to BI-V ICD '13, RV lead fx s/p extraction+reimplantation c/b LV lead dislodgement+reimplantation '14, CRT-D gen exchanged+recanalization left subclavian vein '19([facility]) and chronic atrial fibrillation who presents for scheduled follow-up.\par \par She states her heart disease started in 1987 when she presented to the hospital with MORALES. She was found to have mitral disease associated to rheumatic heart disease and she underwent MV repair. She did okay for ~8y when she presented symptoms again. She believes at that time she was diagnosed with cardiomyopathy, she underwent re-do sternotomy with mechanical MVR followed by ICD placement. She has undergone multiple generators exchange since then. She believes she may had received an ICD shock 'many years ago' while she was at the [place]. She did not seek medical help at that time.\par \par She lives with her family. She is currently unemployed, stopped working ~2 years ago. Denies tobacco, illicit drug use or etoh use. She denies FH of heart disease. [place] is originally from  [place]. \par \par Since her last visit, she had a repeat TTE on 12/2/22 which shows no significant change (LVEF 30%, moderate RV dysfunction, moderate TR).\par \par She reports feeling well today and denies overt HF symptoms. She continues to exercise at home for 25-30 min 3 days/week. Her main reason for stopping is generalized fatigue. She recently was at [place] and was able to walk around the park without any issues. She had some LE edema at the end of the day but has not noticed any since. She specifically denies CP, palpitations, SOB, dizziness/LH, orthopnea and PND. She explains that she is not able to participate in cardiac rehab because she does not have a car and cannot pay for transportation 3 days/week.

## 2023-05-23 NOTE — PHYSICAL EXAM
[No Acute Distress] : no acute distress [Normal Conjunctiva] : normal conjunctiva [Normal Venous Pressure] : normal venous pressure [Normal S1, S2] : normal S1, S2 [No Rub] : no rub [No Gallop] : no gallop [Clear Lung Fields] : clear lung fields [Good Air Entry] : good air entry [No Respiratory Distress] : no respiratory distress  [Soft] : abdomen soft [No Edema] : no edema [No Rash] : no rash [Moves all extremities] : moves all extremities [Alert and Oriented] : alert and oriented [de-identified] : EOMs intact [de-identified] : mechanical click

## 2023-05-23 NOTE — ASSESSMENT
[FreeTextEntry1] : # Chronic systolic HF ACC/AHA stage C, NYHA II-III\par -NICMP/valvular LVEF now 30% from 18%, LVIDd 5.85\par -Euvolemic on exam, warm extremities\par -GDMT: Continue Toprol-XL 25mg daily, Entresto 24-26mg BID, Spironolactone 25mg daily and Farxiga 10mg daily.\par -Diuretics: Not indicated at this time\par -Device: s/p Bi-V. follows with EP (Fabián Arredondo MD). Blunted HR response on CPET. Will d/w Dr. Arredondo.\par -Pt states she exercises at home. Unable to partake in cardiac rehab due to transportation issues.\par -CPET on 11/18 was suboptimal due to poor quality and RER 1.06. AT could not be identified. Independent markers including Ve/VCO2 slope and OUES were abnormal. She demonstrated blunted HR response and spirometry was inconclusive. It seems pt was not engaged with the testing process.  We have discussed the above results. \par -She has some concerning features including decline in functional status and low BP however this appears better today and she denies any further dizziness. \par -Most recent labs are from 3/29 which showed Na 136, K 4.3, Cr 0.72, and pro- (stable)\par -States that she has an appointment with EP in June to review device settings.\par -Low threshold to schedule RHC should patient refer worsening symptoms. \par -Will repeat CPET in about 2 months\par \par # Rheumatic heart disease \par -H/o MV repair 1987 followed by mechanical MVR 1995\par -AC: Warfarin managed by Primary Cardiologist.\par \par # Chronic atrial fibrillation\par -Valvular\par -Asymptomatic\par -AC as above

## 2023-05-23 NOTE — DISCUSSION/SUMMARY
[Patient] : the patient [___ Month(s)] : in [unfilled] month(s) [With ___] : with [unfilled] [FreeTextEntry1] : 53 y/o female with h/o chronic systolic HF ACC/AHA stage C, NICMP/valvular LVEF 13% LVIDd 6cm, rheumatic heart disease s/p mitral valve repair '87 followed by mechanical MVR 1995, s/p ICD '10 upgraded to BI-V ICD '13, RV lead fx s/p extraction+reimplantation c/b LV lead dislodgement+reimplantation '14, CRT-D gen exchanged+recanalization left subclavian vein '19([facility]) and chronic atrial fibrillation who presents for scheduled follow-up.\par She denies overt HF symptoms but has noticed overall decline in activity tolerance. Clinically she is euvolemic w/ warm extremities. BP improved on today's visit. Patient did not bring home log for our review. She denies recurrent dizziness/LH. \par Repeat TTE 12/9/22 was essentially unchanged.  \par She recently underwent a CPET which was inconclusive. She did not achieve AT, RER was 1.06. \par I have recommended cardiac rehab but unfortunately the patient does not have means of transportation.\par For now, she will continue medications as prescribed and should f/u with EP. \par Should her symptoms change will have low threshold for RHC. \par Will alternate visits with Dr. Landers.\par Follow-up with Dr. Crockett after she sees EP in July.\par Plan as above.

## 2023-05-23 NOTE — REASON FOR VISIT
[Cardiac Failure] : cardiac failure [FreeTextEntry3] : Lnog Landers MD [FreeTextEntry1] : Cards: Dr. Landers\par EP: Dr. Arredondo

## 2023-06-21 ENCOUNTER — APPOINTMENT (OUTPATIENT)
Dept: CARDIOLOGY | Facility: CLINIC | Age: 53
End: 2023-06-21
Payer: MEDICAID

## 2023-06-21 VITALS
SYSTOLIC BLOOD PRESSURE: 100 MMHG | DIASTOLIC BLOOD PRESSURE: 66 MMHG | HEART RATE: 70 BPM | HEIGHT: 61 IN | BODY MASS INDEX: 25.49 KG/M2 | RESPIRATION RATE: 14 BRPM | WEIGHT: 135 LBS

## 2023-06-21 LAB — INR PPP: 2.9

## 2023-06-21 PROCEDURE — 99214 OFFICE O/P EST MOD 30 MIN: CPT | Mod: 25

## 2023-06-21 PROCEDURE — 93000 ELECTROCARDIOGRAM COMPLETE: CPT

## 2023-06-23 NOTE — HISTORY OF PRESENT ILLNESS
[FreeTextEntry1] : From a cardiac standpoint, Mrs. Dubon continues to do reasonably well denying chest pain, shortness of breath, or other cardiac symptoms. She is exercising on a regular basis. \par \par

## 2023-06-23 NOTE — ASSESSMENT
[FreeTextEntry1] : 1. EKG today reveals ventricular paced rhythm at 70 bpm. \par \par 2. Rheumatic fever/rheumatic heart disease status-post MVR: Clinically stable without chest pain or shortness of breath. Valve closure sounds are “crisp.”\par \par 3. Non-ischemic cardiomyopathy: Most recent echocardiography revealed normal left ventricular dimensions with moderately decreased global left ventricular systolic function. Ejection fraction somewhere between 30 and 35%. Patient also noted to have severely dilated left atrium and moderately dilated right atrium. Right ventricular function was not able to be assessed due to poor visualization. Patient with known chronic combined systolic and diastolic congestive heart failure which at this time appears to be well compensated on current medications. Advised to continue same and follow a strict low-salt diet. Will follow up with heart failure team. \par \par 4. INR today 2.9: This is within range for her mechanical mitral prosthesis. Advised to recheck every four weeks.   \par   \par

## 2023-07-14 ENCOUNTER — APPOINTMENT (OUTPATIENT)
Dept: HEART FAILURE | Facility: CLINIC | Age: 53
End: 2023-07-14
Payer: MEDICAID

## 2023-07-14 VITALS
HEIGHT: 61 IN | WEIGHT: 135 LBS | HEART RATE: 77 BPM | SYSTOLIC BLOOD PRESSURE: 102 MMHG | BODY MASS INDEX: 25.49 KG/M2 | DIASTOLIC BLOOD PRESSURE: 68 MMHG | OXYGEN SATURATION: 99 %

## 2023-07-14 LAB — A1CG - A1C WITH ESTIMATED AVERAGE GLUCOSE: 5.3

## 2023-07-14 PROCEDURE — 99214 OFFICE O/P EST MOD 30 MIN: CPT

## 2023-07-14 NOTE — DISCUSSION/SUMMARY
[Patient] : the patient [___ Month(s)] : in [unfilled] month(s) [With ___] : with [unfilled] [FreeTextEntry1] : 53 y/o female with h/o chronic systolic HF ACC/AHA stage C, NICMP/valvular LVEF 13% LVIDd 6cm, rheumatic heart disease s/p mitral valve repair '87 followed by mechanical MVR 1995, s/p ICD '10 upgraded to BI-V ICD '13, RV lead fx s/p extraction+reimplantation c/b LV lead dislodgement+reimplantation '14, CRT-D gen exchanged+recanalization left subclavian vein '19([facility]) and chronic atrial fibrillation who presents for routine follow-up.\par She denies overt HF symptoms. Clinically she is euvolemic w/ warm extremities. BP remains stable. \par Repeat TTE 12/9/22 was essentially unchanged.  \par Her CPET in November 2022 was inconclusive. She did not achieve AT, RER was 1.06. \par I have recommended cardiac rehab but unfortunately the patient does not have means of transportation.\par For now, she will continue medications as prescribed and should f/u with EP for device management. \par Should her symptoms change will have low threshold for RHC. \par Will alternate visits with Dr. Landers.\par REpeat CPET in Nov 2023.\par

## 2023-07-14 NOTE — ASSESSMENT
[FreeTextEntry1] : # Chronic systolic HF ACC/AHA stage C, NYHA II-III\par -NICMP/valvular LVEF now 30% from 18%, LVIDd 5.85\par -Euvolemic on exam, warm extremities\par -GDMT: Continue Toprol-XL 25mg daily, Entresto 24-26mg BID, Spironolactone 25mg daily and Farxiga 10mg daily.\par -Diuretics: Not indicated at this time\par -Device: s/p Bi-V. follows with EP (Fabián Arredondo MD). Blunted HR response on CPET. Will d/w Dr. Arredondo.\par -Pt states she exercises at home. Unable to partake in cardiac rehab due to transportation issues.\par -CPET on 11/18 was suboptimal due to poor quality and RER 1.06. AT could not be identified. Independent markers including Ve/VCO2 slope and OUES were abnormal. She demonstrated blunted HR response and spirometry was inconclusive. It seems pt was not engaged with the testing process.  We have discussed the above results. \par -She has some concerning features including decline in functional status and low BP. Today she feels stable. No HF hospitalizations or end organ dysfunction. Will repeat CPET in Nov 2023. \par -Low threshold to schedule RHC should patient refer worsening symptoms. \par \par # Rheumatic heart disease \par -H/o MV repair 1987 followed by mechanical MVR 1995\par -AC: Warfarin managed by Primary Cardiologist.\par \par # Chronic atrial fibrillation\par -Valvular\par -Asymptomatic\par -AC as above

## 2023-07-14 NOTE — HISTORY OF PRESENT ILLNESS
[FreeTextEntry1] : 53 y/o female with h/o chronic systolic HF ACC/AHA stage C, NICMP/valvular LVEF 13% LVIDd 6cm, rheumatic heart disease s/p mitral valve repair '87 followed by mechanical MVR 1995, s/p ICD '10 upgraded to BI-V ICD '13, RV lead fx s/p extraction+reimplantation c/b LV lead dislodgement+reimplantation '14, CRT-D gen exchanged+recanalization left subclavian vein '19([facility]) and chronic atrial fibrillation who presents for scheduled follow-up.\par \par She states her heart disease started in 1987 when she presented to the hospital with MORALES. She was found to have mitral disease associated to rheumatic heart disease and she underwent MV repair. She did okay for ~8y when she presented symptoms again. She believes at that time she was diagnosed with cardiomyopathy, she underwent re-do sternotomy with mechanical MVR followed by ICD placement. She has undergone multiple generators exchange since then. She believes she may had received an ICD shock 'many years ago' while she was at the [facility]. She did not seek medical help at that time.\par \par She lives with her family. She is currently unemployed, stopped working ~2 years ago. Denies tobacco, illicit drug use or etoh use. She denies FH of heart disease. She is originally from  [place]. \par \par Since her last visit, she had a repeat TTE on 12/2/22 which shows no significant change (LVEF 30%, moderate RV dysfunction, moderate TR).\par \par Today, she comes for routine follow up. She reports no overt heart failure symptoms, specifically denies orthopnea, PND, bendopnea, LE edema, chest discomfort, dizziness/lightheadedness, palpitations or syncope. Patient denies any recent ICD shocks. No recent hospitalizations or visits to the ED.\par

## 2023-07-14 NOTE — PHYSICAL EXAM
[No Acute Distress] : no acute distress [Normal Venous Pressure] : normal venous pressure [Normal S1, S2] : normal S1, S2 [No Rub] : no rub [No Gallop] : no gallop [Clear Lung Fields] : clear lung fields [Good Air Entry] : good air entry [No Respiratory Distress] : no respiratory distress  [Soft] : abdomen soft [No Edema] : no edema [No Rash] : no rash [Moves all extremities] : moves all extremities [Alert and Oriented] : alert and oriented [Well Developed] : well developed [Well Nourished] : well nourished [de-identified] : mechanical click

## 2023-07-14 NOTE — REASON FOR VISIT
[Cardiac Failure] : cardiac failure [FreeTextEntry3] : Long Landers MD [FreeTextEntry1] : Cards: Dr. Landers\par EP: Dr. Arredondo

## 2023-07-14 NOTE — CARDIOLOGY SUMMARY
[de-identified] : \par 5/24/22: Paced ventricular rhythm, HR 70bpm\par \par 1/19/22: Paced rhythm, underlying atrial fibrillation\par  [de-identified] : \par 11/18/22 CPET: Ex time 4:58 mins RER 1.06 Blunted HR response (Peak 86 bpm -> 50% MPHR) Rest /56 PEak /70 Vo2 9.2 ml/kg/min (37% predicted) 2.8 METs achieved Ve/VCO2 49 PETCO baseline 29 (abnormal). Hyperventilatory breathing pattern at baseline noted. OUES 0.69 l/min. Quality of study was poor. Spirometry inconclusive. \par \par 6/22/22 Nuc pharm stress: large sized, moderate intensity, fixed defect of the entire inferior/inferoseptal wall consistent with infarction, diaphragmatic attenuation artifact and soft tissue attenuation artifact. LVEF 43%, severe hypokinesis basal-mid inferior wall. \par  [de-identified] : \par 12/9/22 TTE: LVEF 30%, LVIDd 5.85cm, GLS -11.1%, grade II DD, severe LAE, moderate RVE with moderatly reduced RV systolic function, mechanical valve in mitral position (MG 2mmHg, HR 70bpm), moderate TR, PASP 28.2mmHg (RAP 3mmHg).\par \par 1/19/22 TTE: LVEF ~30%, LVIDd 5.25cm, significant LAE, mildly reduced RV function, mechanical MVR with trace MR (MG 3mmHg), moderate TR, RVSP 25.7mmHg.\par \par 5/20/21 TTE LVIDd 6cm LVEF 13% LVOT VTI 23.5cm, mechanical MVR with normal function, mildly reduced RV systolic function, RVSP 22 mmHg, mod-sev TR\par \par 3/6/2018 TTE: LVIDd 5.7cm LVEF 23% , normal functioning mechanical MVR, mildly reduced RV systolic function, RVSP 41 mmHg\par

## 2023-07-18 LAB — INR PPP: 3.1

## 2023-07-24 ENCOUNTER — NON-APPOINTMENT (OUTPATIENT)
Age: 53
End: 2023-07-24

## 2023-09-12 RX ORDER — WARFARIN 2.5 MG/1
2.5 TABLET ORAL DAILY
Qty: 90 | Refills: 3 | Status: ACTIVE | COMMUNITY
Start: 2022-07-22 | End: 1900-01-01

## 2023-09-14 ENCOUNTER — APPOINTMENT (OUTPATIENT)
Dept: CARDIOLOGY | Facility: CLINIC | Age: 53
End: 2023-09-14
Payer: MEDICAID

## 2023-09-14 PROCEDURE — 93306 TTE W/DOPPLER COMPLETE: CPT

## 2023-09-22 ENCOUNTER — NON-APPOINTMENT (OUTPATIENT)
Age: 53
End: 2023-09-22

## 2023-09-22 ENCOUNTER — APPOINTMENT (OUTPATIENT)
Dept: CARDIOLOGY | Facility: CLINIC | Age: 53
End: 2023-09-22
Payer: MEDICAID

## 2023-09-22 VITALS
WEIGHT: 134 LBS | BODY MASS INDEX: 24.66 KG/M2 | DIASTOLIC BLOOD PRESSURE: 70 MMHG | RESPIRATION RATE: 14 BRPM | HEART RATE: 72 BPM | HEIGHT: 62 IN | SYSTOLIC BLOOD PRESSURE: 100 MMHG

## 2023-09-22 LAB — INR PPP: 2.1

## 2023-09-22 PROCEDURE — 93000 ELECTROCARDIOGRAM COMPLETE: CPT

## 2023-09-22 PROCEDURE — 99214 OFFICE O/P EST MOD 30 MIN: CPT | Mod: 25

## 2023-09-22 RX ORDER — WARFARIN 2 MG/1
2 TABLET ORAL
Qty: 100 | Refills: 3 | Status: ACTIVE | COMMUNITY
Start: 2022-07-22 | End: 1900-01-01

## 2023-10-06 LAB — INR PPP: 2.5

## 2023-10-25 ENCOUNTER — RX RENEWAL (OUTPATIENT)
Age: 53
End: 2023-10-25

## 2023-11-09 LAB — INR PPP: 1.8

## 2023-11-14 ENCOUNTER — RX RENEWAL (OUTPATIENT)
Age: 53
End: 2023-11-14

## 2024-01-05 ENCOUNTER — RX RENEWAL (OUTPATIENT)
Age: 54
End: 2024-01-05

## 2024-01-05 ENCOUNTER — APPOINTMENT (OUTPATIENT)
Dept: CARDIOLOGY | Facility: CLINIC | Age: 54
End: 2024-01-05
Payer: MEDICAID

## 2024-01-05 VITALS
HEIGHT: 61 IN | SYSTOLIC BLOOD PRESSURE: 100 MMHG | HEART RATE: 70 BPM | RESPIRATION RATE: 14 BRPM | DIASTOLIC BLOOD PRESSURE: 60 MMHG | BODY MASS INDEX: 25.68 KG/M2 | WEIGHT: 136 LBS

## 2024-01-05 LAB — PROTHROMBIN TIME AND INR, PLASMA: 2.2

## 2024-01-05 PROCEDURE — 93000 ELECTROCARDIOGRAM COMPLETE: CPT

## 2024-01-05 PROCEDURE — 99214 OFFICE O/P EST MOD 30 MIN: CPT | Mod: 25

## 2024-01-08 RX ORDER — SACUBITRIL AND VALSARTAN 24; 26 MG/1; MG/1
24-26 TABLET, FILM COATED ORAL
Qty: 180 | Refills: 1 | Status: ACTIVE | COMMUNITY
Start: 2021-06-16 | End: 1900-01-01

## 2024-01-08 NOTE — PHYSICAL EXAM
[Well Developed] : well developed [Well Nourished] : well nourished [No Acute Distress] : no acute distress [Normal Conjunctiva] : normal conjunctiva [Normal Venous Pressure] : normal venous pressure [No Carotid Bruit] : no carotid bruit [Normal S1, S2] : normal S1, S2 [No Rub] : no rub [No Gallop] : no gallop [Clear Lung Fields] : clear lung fields [No Respiratory Distress] : no respiratory distress  [Normal Bowel Sounds] : normal bowel sounds [Normal Gait] : normal gait [No Edema] : no edema [No Rash] : no rash [Moves all extremities] : moves all extremities [No Focal Deficits] : no focal deficits [Normal Speech] : normal speech [Alert and Oriented] : alert and oriented [Normal memory] : normal memory [de-identified] : I-II/VI systolic murmur

## 2024-01-08 NOTE — HISTORY OF PRESENT ILLNESS
[FreeTextEntry1] : From a cardiac standpoint, Mr. Dubon continues to do well denying exertional chest pain, shortness of breath, or other cardiac symptoms. She states she exercises at home several days a week.

## 2024-01-08 NOTE — ASSESSMENT
[FreeTextEntry1] : 1. EKG today demonstrates ventricular paced rhythm at 70 bpm.   2. Rheumatic fever/rheumatic heart disease status-post mechanical MVR: Clinically stable without chest pain or shortness of breath. INR today 2.2. Patient alternating Coumadin 4 and 3 mg daily. I have recommended that she stay on 4 mg daily for the next three days and then begin a regimen of Coumadin 3 mg Mondays, Wednesdays and Fridays. All other days, 4 mg. Will repeat INR in approximately 1-2 weeks.  3. Non-ischemic cardiomyopathy/combined systolic/diastolic heart failure: Clinically stable and well compensated on current medications. A low-salt diet and continuation of current exercise recommended. Follow-up office visit planned in three months.

## 2024-01-08 NOTE — REASON FOR VISIT
[FreeTextEntry1] : Mrs. Dubon is a pleasant 53-year-old  female with a past medical history significant for rheumatic fever/rheumatic heart disease status-post mitral valve repair in 1987 followed by mechanical MVR in 1995, as well as associated dilated cardiomyopathy with reduced left ventricular ejection fraction status-post AICD implantation for primary prevention, and more recently, the development of chronic atrial fibrillation and systolic heart failure, who presents for follow up evaluation.

## 2024-01-24 LAB — INR PPP: 2.1

## 2024-02-20 ENCOUNTER — APPOINTMENT (OUTPATIENT)
Dept: HEART FAILURE | Facility: CLINIC | Age: 54
End: 2024-02-20
Payer: MEDICAID

## 2024-02-20 VITALS
BODY MASS INDEX: 26.15 KG/M2 | SYSTOLIC BLOOD PRESSURE: 108 MMHG | HEART RATE: 70 BPM | DIASTOLIC BLOOD PRESSURE: 60 MMHG | HEIGHT: 61 IN | WEIGHT: 138.5 LBS | OXYGEN SATURATION: 99 %

## 2024-02-20 DIAGNOSIS — I42.9 CARDIOMYOPATHY, UNSPECIFIED: ICD-10-CM

## 2024-02-20 PROCEDURE — 99214 OFFICE O/P EST MOD 30 MIN: CPT

## 2024-03-15 ENCOUNTER — NON-APPOINTMENT (OUTPATIENT)
Age: 54
End: 2024-03-15

## 2024-03-19 LAB — INR PPP: 2.6

## 2024-03-28 ENCOUNTER — RX RENEWAL (OUTPATIENT)
Age: 54
End: 2024-03-28

## 2024-04-05 ENCOUNTER — APPOINTMENT (OUTPATIENT)
Dept: CARDIOLOGY | Facility: CLINIC | Age: 54
End: 2024-04-05
Payer: MEDICAID

## 2024-04-05 VITALS
WEIGHT: 133 LBS | RESPIRATION RATE: 14 BRPM | HEART RATE: 73 BPM | HEIGHT: 61 IN | SYSTOLIC BLOOD PRESSURE: 100 MMHG | DIASTOLIC BLOOD PRESSURE: 70 MMHG | BODY MASS INDEX: 25.11 KG/M2

## 2024-04-05 LAB — INR PPP: 2.5

## 2024-04-05 PROCEDURE — 99214 OFFICE O/P EST MOD 30 MIN: CPT

## 2024-04-05 PROCEDURE — G2211 COMPLEX E/M VISIT ADD ON: CPT | Mod: NC,1L

## 2024-04-05 PROCEDURE — 93000 ELECTROCARDIOGRAM COMPLETE: CPT

## 2024-04-09 NOTE — HISTORY OF PRESENT ILLNESS
[FreeTextEntry1] : Mrs. Dubon presents today without complaints of chest pain, shortness of breath, or other cardiac symptoms. Continues to exercise at home utilizing workout videos several times a week without untoward effects. She is currently losing weight gradually.

## 2024-04-09 NOTE — PHYSICAL EXAM
[Well Developed] : well developed [Well Nourished] : well nourished [No Acute Distress] : no acute distress [Normal Conjunctiva] : normal conjunctiva [Normal Venous Pressure] : normal venous pressure [No Carotid Bruit] : no carotid bruit [Normal S1, S2] : normal S1, S2 [No Rub] : no rub [No Gallop] : no gallop [Clear Lung Fields] : clear lung fields [No Respiratory Distress] : no respiratory distress  [Normal Bowel Sounds] : normal bowel sounds [Normal Gait] : normal gait [No Edema] : no edema [No Rash] : no rash [Moves all extremities] : moves all extremities [No Focal Deficits] : no focal deficits [Normal Speech] : normal speech [Alert and Oriented] : alert and oriented [Normal memory] : normal memory [de-identified] : I-II/VI systolic murmur

## 2024-04-09 NOTE — ASSESSMENT
[FreeTextEntry1] : 1. EKG today demonstrates ventricular paced rhythm at 73 bpm. Occasional PVC.   2. Rheumatic fever/rheumatic heart disease status-post mitral valve replacement: Clinically stable. Clinically, the valve has good closure sounds. INR today 2.5. Advised to continue current Coumadin dose and follow closely.   3. Dilated cardiomyopathy with reduced left ventricular ejection fraction: Clinically stable at this time without signs or symptoms of heart failure. Advised to continue with all current medications and follow a strict low-salt diet. Weight loss was discussed as well.   4. AICD: Patient in need of a follow up interrogation in the next month. Will continue Dr. Arredondo at [place] to schedule this.   5. Review of recent bloodwork reveals a total cholesterol of 188, HDL 63, TC/HDL ratio 4.1, , triglycerides 63. Patient advised on a strict low-fat / low-cholesterol diet and regular aerobic exercise.  If clinically stable, office visit three months.

## 2024-04-18 ENCOUNTER — RX RENEWAL (OUTPATIENT)
Age: 54
End: 2024-04-18

## 2024-04-22 ENCOUNTER — RX RENEWAL (OUTPATIENT)
Age: 54
End: 2024-04-22

## 2024-04-22 RX ORDER — DAPAGLIFLOZIN 10 MG/1
10 TABLET, FILM COATED ORAL
Qty: 90 | Refills: 1 | Status: ACTIVE | COMMUNITY
Start: 2024-04-22 | End: 1900-01-01

## 2024-05-06 ENCOUNTER — RX RENEWAL (OUTPATIENT)
Age: 54
End: 2024-05-06

## 2024-05-06 RX ORDER — SPIRONOLACTONE 25 MG/1
25 TABLET ORAL
Qty: 90 | Refills: 0 | Status: ACTIVE | COMMUNITY
Start: 2022-10-07 | End: 1900-01-01

## 2024-05-16 ENCOUNTER — APPOINTMENT (OUTPATIENT)
Dept: CARDIOLOGY | Facility: CLINIC | Age: 54
End: 2024-05-16

## 2024-05-17 ENCOUNTER — APPOINTMENT (OUTPATIENT)
Dept: HEART FAILURE | Facility: CLINIC | Age: 54
End: 2024-05-17

## 2024-05-23 RX ORDER — METOPROLOL SUCCINATE 25 MG/1
25 TABLET, EXTENDED RELEASE ORAL
Qty: 90 | Refills: 1 | Status: ACTIVE | COMMUNITY
Start: 2021-07-06 | End: 1900-01-01

## 2024-05-24 ENCOUNTER — APPOINTMENT (OUTPATIENT)
Dept: HEART FAILURE | Facility: CLINIC | Age: 54
End: 2024-05-24
Payer: MEDICAID

## 2024-05-24 ENCOUNTER — APPOINTMENT (OUTPATIENT)
Dept: CARDIOLOGY | Facility: CLINIC | Age: 54
End: 2024-05-24
Payer: MEDICAID

## 2024-05-24 VITALS
OXYGEN SATURATION: 99 % | HEIGHT: 61 IN | HEART RATE: 77 BPM | WEIGHT: 140.4 LBS | SYSTOLIC BLOOD PRESSURE: 104 MMHG | DIASTOLIC BLOOD PRESSURE: 78 MMHG | BODY MASS INDEX: 26.51 KG/M2

## 2024-05-24 DIAGNOSIS — I09.9 RHEUMATIC HEART DISEASE, UNSPECIFIED: ICD-10-CM

## 2024-05-24 DIAGNOSIS — I50.42 CHRONIC COMBINED SYSTOLIC (CONGESTIVE) AND DIASTOLIC (CONGESTIVE) HEART FAILURE: ICD-10-CM

## 2024-05-24 DIAGNOSIS — Z95.2 PRESENCE OF PROSTHETIC HEART VALVE: ICD-10-CM

## 2024-05-24 DIAGNOSIS — I42.8 OTHER CARDIOMYOPATHIES: ICD-10-CM

## 2024-05-24 DIAGNOSIS — Z95.810 PRESENCE OF AUTOMATIC (IMPLANTABLE) CARDIAC DEFIBRILLATOR: ICD-10-CM

## 2024-05-24 DIAGNOSIS — I48.20 CHRONIC ATRIAL FIBRILLATION, UNSP: ICD-10-CM

## 2024-05-24 PROCEDURE — 93000 ELECTROCARDIOGRAM COMPLETE: CPT

## 2024-05-24 PROCEDURE — 93306 TTE W/DOPPLER COMPLETE: CPT

## 2024-05-24 PROCEDURE — 99214 OFFICE O/P EST MOD 30 MIN: CPT | Mod: 25

## 2024-05-24 RX ORDER — DAPAGLIFLOZIN 10 MG/1
10 TABLET, FILM COATED ORAL
Qty: 90 | Refills: 1 | Status: DISCONTINUED | COMMUNITY
Start: 2021-08-03 | End: 2024-05-24

## 2024-05-28 RX ORDER — AMOXICILLIN 500 MG/1
500 CAPSULE ORAL
Qty: 4 | Refills: 1 | Status: ACTIVE | COMMUNITY

## 2024-05-31 ENCOUNTER — NON-APPOINTMENT (OUTPATIENT)
Age: 54
End: 2024-05-31

## 2024-06-10 LAB — INR PPP: 1.8

## 2024-06-10 NOTE — PHYSICAL EXAM
[Well Developed] : well developed [No Acute Distress] : no acute distress [Normal Venous Pressure] : normal venous pressure [Normal S1, S2] : normal S1, S2 [No Murmur] : no murmur [Clear Lung Fields] : clear lung fields [No Respiratory Distress] : no respiratory distress  [Soft] : abdomen soft [No Edema] : no edema [Moves all extremities] : moves all extremities [Alert and Oriented] : alert and oriented [de-identified] : click appreciated [de-identified] : warm

## 2024-06-10 NOTE — REASON FOR VISIT
[Cardiac Failure] : cardiac failure [FreeTextEntry3] : Long Landers MD [FreeTextEntry1] : Cards: Dr. Landers HF: Dr. Crockett EP: Dr. Arredondo

## 2024-06-10 NOTE — END OF VISIT
[FreeTextEntry3] : IDebbie MD independently performed a history and physical examination of the patient, and formulated the management plan.I have reviewed the note by POPEYE Aguirre and agree with the documented findings and plan of care.  [Time Spent: ___ minutes] : I have spent [unfilled] minutes of time on the encounter.

## 2024-06-10 NOTE — DISCUSSION/SUMMARY
[EKG obtained to assist in diagnosis and management of assessed problem(s)] : EKG obtained to assist in diagnosis and management of assessed problem(s) [FreeTextEntry1] : 52 y/o female with h/o chronic systolic HF ACC/AHA stage C, NICMP/valvular LVEF 25-30% LVIDd 5.9cm, rheumatic heart disease s/p mitral valve repair '87 followed by mechanical MVR 1995, s/p ICD '10 upgraded to BI-V ICD '13, RV lead fx s/p extraction+reimplantation c/b LV lead dislodgement+reimplantation '14, CRT-D gen exchanged+recanalization left subclavian vein '19([facility]) and chronic atrial fibrillation who presents for scheduled follow-up. She denies overt HF symptoms, NYHA class II. Clinically she appears euvolemic. BP remains stable.  Her CPET in November 2022 was inconclusive. She did not achieve AT, RER was 1.06.  I have recommended cardiac rehab but unfortunately the patient does not have means of transportation. For now, she will continue medications as prescribed and repeat CPET.  Should her symptoms change will have low threshold for RHC.  Will alternate visits with Dr. Landers. Plan as above.

## 2024-06-10 NOTE — CARDIOLOGY SUMMARY
[de-identified] : 1/5/24: BiV-paced, 70 bpm. 5/24/22: Paced ventricular rhythm, HR 70bpm 1/19/22: Paced rhythm, underlying atrial fibrillation [de-identified] : 11/18/22 CPET: Ex time 4:58 mins RER 1.06 Blunted HR response (Peak 86 bpm -> 50% MPHR) Rest /56 PEak /70 Vo2 9.2 ml/kg/min (37% predicted) 2.8 METs achieved Ve/VCO2 49 PETCO baseline 29 (abnormal). Hyperventilatory breathing pattern at baseline noted. OUES 0.69 l/min. Quality of study was poor. Spirometry inconclusive.   6/22/22 Nuc pharm stress: large sized, moderate intensity, fixed defect of the entire inferior/inferoseptal wall consistent with infarction, diaphragmatic attenuation artifact and soft tissue attenuation artifact. LVEF 43%, severe hypokinesis basal-mid inferior wall.   [de-identified] : 9/14/23 TTE: LVEF 25-30%, LVIDd 5.9cm, normal RV size/function, severe LAE, mod-severe TR due to RV lead, est PASP 36 mmHg, IVC dilated.  12/9/22 TTE: LVEF 30%, LVIDd 5.85cm, GLS -11.1%, grade II DD, severe LAE, moderate RVE with moderatly reduced RV systolic function, mechanical valve in mitral position (MG 2mmHg, HR 70bpm), moderate TR, PASP 28.2mmHg (RAP 3mmHg).  1/19/22 TTE: LVEF ~30%, LVIDd 5.25cm, significant LAE, mildly reduced RV function, mechanical MVR with trace MR (MG 3mmHg), moderate TR, RVSP 25.7mmHg.  5/20/21 TTE LVIDd 6cm LVEF 13% LVOT VTI 23.5cm, mechanical MVR with normal function, mildly reduced RV systolic function, RVSP 22 mmHg, mod-sev TR  3/6/2018 TTE: LVIDd 5.7cm LVEF 23% , normal functioning mechanical MVR, mildly reduced RV systolic function, RVSP 41 mmHg

## 2024-06-10 NOTE — HISTORY OF PRESENT ILLNESS
[FreeTextEntry1] : 54 y/o female with h/o chronic systolic HF ACC/AHA stage C, NICMP/valvular LVEF 25-30% LVIDd 5.9cm, rheumatic heart disease s/p mitral valve repair '87 followed by mechanical MVR 1995, s/p ICD '10 upgraded to BI-V ICD '13, RV lead fx s/p extraction+reimplantation c/b LV lead dislodgement+reimplantation '14, CRT-D gen exchanged+recanalization left subclavian vein '19([facility]) and chronic atrial fibrillation who presents for scheduled follow-up.  She states her heart disease started in 1987 when she presented to the hospital with MORALES. She was found to have mitral disease associated to rheumatic heart disease and she underwent MV repair. She did okay for ~8y when she presented symptoms again. She believes at that time she was diagnosed with cardiomyopathy, she underwent re-do sternotomy with mechanical MVR followed by ICD placement. She has undergone multiple generators exchange since then. She believes she may had received an [place] She did not seek medical help at that time.  She lives with her family. She is currently unemployed, stopped working ~2 years ago. Denies tobacco, illicit drug use or etoh use. She denies FH of heart disease. [place] is originally from  [place]   Today, she comes for routine follow up. She refers some weight gain and abdominal bloating. She refers that she exercises at home for 25 minutes 4x/week but not as much as previously as she does fatigue easily. She denies any overt HF symptoms such as SOB at rest, orthopnea, edema. Denies CP, palpitations, dizziness/LH or syncope. No ICD shocks or hospital visits.

## 2024-06-10 NOTE — HISTORY OF PRESENT ILLNESS
[FreeTextEntry1] : 54 y/o female with h/o chronic systolic HF ACC/AHA stage C, NICMP/valvular LVEF 25-30% LVIDd 5.9cm, rheumatic heart disease s/p mitral valve repair '87 followed by mechanical MVR 1995, s/p ICD '10 upgraded to BI-V ICD '13, RV lead fx s/p extraction+reimplantation c/b LV lead dislodgement+reimplantation '14, CRT-D gen exchanged+recanalization left subclavian vein '19([facility]) and chronic atrial fibrillation who presents for scheduled follow-up.  She states her heart disease started in 1987 when she presented to the hospital with MORALES. She was found to have mitral disease associated to rheumatic heart disease and she underwent MV repair. She did okay for ~8y when she presented symptoms again. She believes at that time she was diagnosed with cardiomyopathy, she underwent re-do sternotomy with mechanical MVR followed by ICD placement. She has undergone multiple generators exchange since then. She believes she may had received an [place] She did not seek medical help at that time.  She lives with her family. She is currently unemployed, stopped working ~2 years ago. Denies tobacco, illicit drug use or etoh use. She denies FH of heart disease. She is originally from  [place].   Today, she comes for routine follow up. She exercises at home with workout videos such as [employer] for ~30-40 mins, 3-4 times per week, without any issues. She reports slow weight gain which she attributes to her diet. She reports no overt heart failure symptoms, specifically denies orthopnea, PND, bendopnea, LE edema, chest discomfort, dizziness/lightheadedness, palpitations or syncope. Patient denies any recent ICD shocks. No recent hospitalizations or visits to the ED.

## 2024-06-10 NOTE — PHYSICAL EXAM
[Well Developed] : well developed [Well Nourished] : well nourished [No Acute Distress] : no acute distress [Normal Venous Pressure] : normal venous pressure [Normal S1, S2] : normal S1, S2 [No Rub] : no rub [No Gallop] : no gallop [Clear Lung Fields] : clear lung fields [Good Air Entry] : good air entry [No Respiratory Distress] : no respiratory distress  [Soft] : abdomen soft [No Edema] : no edema [No Rash] : no rash [Moves all extremities] : moves all extremities [Alert and Oriented] : alert and oriented [de-identified] : mechanical click

## 2024-06-10 NOTE — ASSESSMENT
[FreeTextEntry1] : # Chronic systolic HF ACC/AHA stage C, NYHA II -NICMP/valvular LVEF now 25-30% from 18%, LVIDd 5.9cm -Euvolemic on exam, warm extremities -GDMT: Continue Toprol-XL 25mg daily, Entresto 24-26mg BID, Spironolactone 25mg daily and Farxiga 10mg daily. -Diuretics: Not indicated at this time -No recent labs, will order today. -Device: s/p Bi-V. follows with EP (Fabián Arredondo MD) -Pt states she exercises at home. Unable to partake in cardiac rehab due to transportation issues. -CPET on 11/18 was suboptimal due to poor quality and RER 1.06. AT could not be identified. Independent markers including Ve/VCO2 slope and OUES were abnormal. She demonstrated blunted HR response and spirometry was inconclusive. It seems pt was not engaged with the testing process.  We have discussed the above results.  -She has some concerning features including decline in functional status and low BP. Today she feels stable. No HF hospitalizations or end organ dysfunction. Will schedule her for a repeat CPET and same day TTE after. -Low threshold to schedule RHC should patient refer worsening symptoms.   # Rheumatic heart disease  -H/o MV repair 1987 followed by mechanical MVR 1995 -AC: Warfarin managed by Primary Cardiologist.  # Chronic atrial fibrillation -Valvular -Asymptomatic -AC as above  # Mod-severe TR -Last TTE was 6 months ago, will order repeat TTE as above.  RTO in 3 months with Dr. Crockett.

## 2024-06-10 NOTE — DISCUSSION/SUMMARY
[FreeTextEntry1] : 52 y/o female with h/o chronic systolic HF ACC/AHA stage C, NICMP/valvular LVEF 25-30% LVIDd 5.9cm, rheumatic heart disease s/p mitral valve repair '87 followed by mechanical MVR 1995, s/p ICD '10 upgraded to BI-V ICD '13, RV lead fx s/p extraction+reimplantation c/b LV lead dislodgement+reimplantation '14, CRT-D gen exchanged+recanalization left subclavian vein '19([facility]) and chronic atrial fibrillation who presents for scheduled follow-up. She denies overt HF symptoms. Clinically she is euvolemic w/ warm extremities. BP remains stable.  Her CPET in November 2022 was inconclusive. She did not achieve AT, RER was 1.06.  I have recommended cardiac rehab but unfortunately the patient does not have means of transportation. For now, she will continue medications as prescribed and repeat CPET/TTE. Should her symptoms change will have low threshold for RHC.  Will alternate visits with Dr. Landers. Plan as above.

## 2024-06-10 NOTE — CARDIOLOGY SUMMARY
[de-identified] : 5/24/24: SB 55 bpm, PRWP, LBBB  1/5/24: BiV-paced, 70 bpm. 5/24/22: Paced ventricular rhythm, HR 70bpm 1/19/22: Paced rhythm, underlying atrial fibrillation [de-identified] : 11/18/22 CPET: Ex time 4:58 mins RER 1.06 Blunted HR response (Peak 86 bpm -> 50% MPHR) Rest /56 PEak /70 Vo2 9.2 ml/kg/min (37% predicted) 2.8 METs achieved Ve/VCO2 49 PETCO baseline 29 (abnormal). Hyperventilatory breathing pattern at baseline noted. OUES 0.69 l/min. Quality of study was poor. Spirometry inconclusive.   6/22/22 Nuc pharm stress: large sized, moderate intensity, fixed defect of the entire inferior/inferoseptal wall consistent with infarction, diaphragmatic attenuation artifact and soft tissue attenuation artifact. LVEF 43%, severe hypokinesis basal-mid inferior wall.   [de-identified] : 9/14/23 TTE: LVEF 25-30%, LVIDd 5.9cm, normal RV size/function, severe LAE, mod-severe TR due to RV lead, est PASP 36 mmHg, IVC dilated.  12/9/22 TTE: LVEF 30%, LVIDd 5.85cm, GLS -11.1%, grade II DD, severe LAE, moderate RVE with moderatly reduced RV systolic function, mechanical valve in mitral position (MG 2mmHg, HR 70bpm), moderate TR, PASP 28.2mmHg (RAP 3mmHg).  1/19/22 TTE: LVEF ~30%, LVIDd 5.25cm, significant LAE, mildly reduced RV function, mechanical MVR with trace MR (MG 3mmHg), moderate TR, RVSP 25.7mmHg.  5/20/21 TTE LVIDd 6cm LVEF 13% LVOT VTI 23.5cm, mechanical MVR with normal function, mildly reduced RV systolic function, RVSP 22 mmHg, mod-sev TR  3/6/2018 TTE: LVIDd 5.7cm LVEF 23% , normal functioning mechanical MVR, mildly reduced RV systolic function, RVSP 41 mmHg

## 2024-06-10 NOTE — END OF VISIT
[Time Spent: ___ minutes] : I have spent [unfilled] minutes of time on the encounter. [FreeTextEntry3] : IDebbie MD independently performed a history and physical examination of the patient and formulated the management plan. I have reviewed the note by ROOPA Good and agree with the documented findings and plan of care.

## 2024-06-20 LAB — INR PPP: 2.2

## 2024-07-01 LAB — INR PPP: 2.5

## 2024-07-10 NOTE — REASON FOR VISIT
Patient was seen today for an educational visit. Visited patient at bedside to provide education related to heart failure. Patient given education booklet entitled \"Living well with Heart Failure\" along with a card that includes a website to download the patient booklet.    Action:   -Taught patient contributing factors leading to heart failure  -Provided information on HFRC and services available to manage fluid volume  -Reviewed WARNING signs of Heart Failure (given CHF Zone handout)  -Reviewed low sodium diet (2,000 mg) and fluid restriction  -Educated on importance of monitoring daily weight    EF: 60 %    Response:  Patient is not sure if he will be able to go home or will need a skill nursing facility.  He is asking that we call him after discharge, to set up an appointment for the Heart Failure Clinic.    Patient verbalized understanding. Will continue to follow patient while admitted. Provided 15 minutes of education on the above.   [FreeTextEntry1] : Mrs. Dubon is a pleasant 50-year-old  female with a past medical history significant for rheumatic fever/rheumatic heart disease status-post mitral valve repair in 1987 followed by mechanical MVR in 1995 as well as associated dilated cardiomyopathy with reduced left ventricular ejection fraction status-post AICD implantation and chronic atrial fibrillation who presents for follow up evaluation.  \par \par

## 2024-07-18 ENCOUNTER — APPOINTMENT (OUTPATIENT)
Dept: CARDIOLOGY | Facility: CLINIC | Age: 54
End: 2024-07-18
Payer: MEDICAID

## 2024-07-18 VITALS
WEIGHT: 140 LBS | DIASTOLIC BLOOD PRESSURE: 70 MMHG | RESPIRATION RATE: 14 BRPM | HEIGHT: 61 IN | SYSTOLIC BLOOD PRESSURE: 112 MMHG | BODY MASS INDEX: 26.43 KG/M2 | HEART RATE: 70 BPM

## 2024-07-18 DIAGNOSIS — Z95.810 PRESENCE OF AUTOMATIC (IMPLANTABLE) CARDIAC DEFIBRILLATOR: ICD-10-CM

## 2024-07-18 DIAGNOSIS — R10.13 EPIGASTRIC PAIN: ICD-10-CM

## 2024-07-18 DIAGNOSIS — I42.8 OTHER CARDIOMYOPATHIES: ICD-10-CM

## 2024-07-18 DIAGNOSIS — I48.20 CHRONIC ATRIAL FIBRILLATION, UNSP: ICD-10-CM

## 2024-07-18 DIAGNOSIS — I09.9 RHEUMATIC HEART DISEASE, UNSPECIFIED: ICD-10-CM

## 2024-07-18 LAB — INR PPP: 1.7

## 2024-07-18 PROCEDURE — 99214 OFFICE O/P EST MOD 30 MIN: CPT

## 2024-07-18 PROCEDURE — 93000 ELECTROCARDIOGRAM COMPLETE: CPT

## 2024-07-18 PROCEDURE — G2211 COMPLEX E/M VISIT ADD ON: CPT | Mod: NC

## 2024-07-18 RX ORDER — OMEPRAZOLE 20 MG/1
20 CAPSULE, DELAYED RELEASE ORAL DAILY
Qty: 30 | Refills: 1 | Status: ACTIVE | COMMUNITY
Start: 2024-07-18 | End: 1900-01-01

## 2024-07-26 LAB
INR PPP: 2.2
PROTHROMBIN TIME COMMENT: NORMAL

## 2024-07-29 ENCOUNTER — RX RENEWAL (OUTPATIENT)
Age: 54
End: 2024-07-29

## 2024-08-22 LAB — INR PPP: 2.5

## 2024-08-27 ENCOUNTER — RX RENEWAL (OUTPATIENT)
Age: 54
End: 2024-08-27

## 2024-09-06 LAB — INR PPP: 4.6

## 2024-10-01 ENCOUNTER — APPOINTMENT (OUTPATIENT)
Dept: CARDIOLOGY | Facility: CLINIC | Age: 54
End: 2024-10-01
Payer: MEDICAID

## 2024-10-01 ENCOUNTER — NON-APPOINTMENT (OUTPATIENT)
Age: 54
End: 2024-10-01

## 2024-10-01 VITALS
BODY MASS INDEX: 26.43 KG/M2 | SYSTOLIC BLOOD PRESSURE: 99 MMHG | HEART RATE: 70 BPM | RESPIRATION RATE: 14 BRPM | HEIGHT: 61 IN | WEIGHT: 140 LBS | DIASTOLIC BLOOD PRESSURE: 68 MMHG

## 2024-10-01 DIAGNOSIS — I09.9 RHEUMATIC HEART DISEASE, UNSPECIFIED: ICD-10-CM

## 2024-10-01 DIAGNOSIS — I48.20 CHRONIC ATRIAL FIBRILLATION, UNSP: ICD-10-CM

## 2024-10-01 DIAGNOSIS — Z95.810 PRESENCE OF AUTOMATIC (IMPLANTABLE) CARDIAC DEFIBRILLATOR: ICD-10-CM

## 2024-10-01 DIAGNOSIS — Z95.2 PRESENCE OF PROSTHETIC HEART VALVE: ICD-10-CM

## 2024-10-01 DIAGNOSIS — R07.9 CHEST PAIN, UNSPECIFIED: ICD-10-CM

## 2024-10-01 DIAGNOSIS — I42.8 OTHER CARDIOMYOPATHIES: ICD-10-CM

## 2024-10-01 DIAGNOSIS — E55.9 VITAMIN D DEFICIENCY, UNSPECIFIED: ICD-10-CM

## 2024-10-01 LAB — INR PPP: 2.8

## 2024-10-01 PROCEDURE — 99214 OFFICE O/P EST MOD 30 MIN: CPT

## 2024-10-01 PROCEDURE — 93000 ELECTROCARDIOGRAM COMPLETE: CPT

## 2024-10-01 RX ORDER — WARFARIN 5 MG/1
5 TABLET ORAL DAILY
Qty: 90 | Refills: 3 | Status: ACTIVE | COMMUNITY
Start: 2024-10-01 | End: 1900-01-01

## 2024-10-01 NOTE — PHYSICAL EXAM
[Well Developed] : well developed [Well Nourished] : well nourished [No Acute Distress] : no acute distress [Normal Conjunctiva] : normal conjunctiva [Normal Venous Pressure] : normal venous pressure [No Carotid Bruit] : no carotid bruit [Normal S1, S2] : normal S1, S2 [No Rub] : no rub [No Gallop] : no gallop [Clear Lung Fields] : clear lung fields [No Respiratory Distress] : no respiratory distress  [Normal Bowel Sounds] : normal bowel sounds [Normal Gait] : normal gait [No Edema] : no edema [Normal] : no rash, no skin lesions [Moves all extremities] : moves all extremities [No Focal Deficits] : no focal deficits [Normal Speech] : normal speech [Alert and Oriented] : alert and oriented [Normal memory] : normal memory [de-identified] : I-II/VI systolic murmur

## 2024-10-01 NOTE — DISCUSSION/SUMMARY
[FreeTextEntry1] : 1 - Rheumatic fever / rheumatic heart disease status-post mechanical MVR in 1995:  presently without complaints of shortness of breath, palpitations, lightheadedness or syncope.   Has history of chronic systolic heart failure ACC-AHA stage c with associated non-ischemic cardiomyopathy/valvular heart disease with reduced left ventricular ejection fraction estimated between 25 and 30% who appears well-compensated clinically and physically active.  In need of follow up appointment with Dr. Crockett.  2 - Epigastric discomfort / chest pain:  presents today with continuing complaints of mid-epigastric discomfort without relief with omeprazole.  States it can come and go, with and without exertion and last seconds at a time.  Denies any associated shortness of breath, palpitations, lightheadedness or syncope.  Will schedule Mrs. Dubon for a 1-day pharmacologic nuclear stress test to assess coronary circulation.  3 - Chronic atrial fibrillation / mechanical MVR:  Today's INR is 2.8.  Continue with warfarin 5mg daily.  Repeat INR in 2 weeks.  Patient goes to lab to have INR drawn.  4 - Fasting blood work prior to follow up visit. [EKG obtained to assist in diagnosis and management of assessed problem(s)] : EKG obtained to assist in diagnosis and management of assessed problem(s)

## 2024-10-01 NOTE — HISTORY OF PRESENT ILLNESS
[FreeTextEntry1] : Mrs. Dubon presents today with continuing complaints of mid-epigastric discomfort without relief with omeprazole.  States it can come and go, with and without exertion and last seconds at a time.  Denies any associated shortness of breath, palpitations, lightheadedness or syncope.

## 2024-10-09 ENCOUNTER — RX RENEWAL (OUTPATIENT)
Age: 54
End: 2024-10-09

## 2024-10-16 ENCOUNTER — APPOINTMENT (OUTPATIENT)
Dept: HEART FAILURE | Facility: CLINIC | Age: 54
End: 2024-10-16

## 2024-10-16 VITALS
HEART RATE: 73 BPM | HEIGHT: 61 IN | BODY MASS INDEX: 26.24 KG/M2 | DIASTOLIC BLOOD PRESSURE: 64 MMHG | OXYGEN SATURATION: 98 % | WEIGHT: 139 LBS | SYSTOLIC BLOOD PRESSURE: 102 MMHG

## 2024-10-16 DIAGNOSIS — I50.42 CHRONIC COMBINED SYSTOLIC (CONGESTIVE) AND DIASTOLIC (CONGESTIVE) HEART FAILURE: ICD-10-CM

## 2024-10-16 PROCEDURE — 99214 OFFICE O/P EST MOD 30 MIN: CPT

## 2024-10-18 ENCOUNTER — RX RENEWAL (OUTPATIENT)
Age: 54
End: 2024-10-18

## 2024-10-22 ENCOUNTER — RX RENEWAL (OUTPATIENT)
Age: 54
End: 2024-10-22

## 2024-11-18 ENCOUNTER — RX RENEWAL (OUTPATIENT)
Age: 54
End: 2024-11-18

## 2024-11-21 ENCOUNTER — APPOINTMENT (OUTPATIENT)
Dept: CARDIOLOGY | Facility: CLINIC | Age: 54
End: 2024-11-21

## 2025-01-15 ENCOUNTER — RX RENEWAL (OUTPATIENT)
Age: 55
End: 2025-01-15

## 2025-02-13 ENCOUNTER — APPOINTMENT (OUTPATIENT)
Dept: CARDIOLOGY | Facility: CLINIC | Age: 55
End: 2025-02-13